# Patient Record
Sex: MALE | Race: WHITE | HISPANIC OR LATINO | Employment: OTHER | URBAN - METROPOLITAN AREA
[De-identification: names, ages, dates, MRNs, and addresses within clinical notes are randomized per-mention and may not be internally consistent; named-entity substitution may affect disease eponyms.]

---

## 2022-06-17 ENCOUNTER — APPOINTMENT (OUTPATIENT)
Dept: RADIOLOGY | Facility: MEDICAL CENTER | Age: 87
DRG: 084 | End: 2022-06-17
Attending: SURGERY
Payer: COMMERCIAL

## 2022-06-17 ENCOUNTER — APPOINTMENT (OUTPATIENT)
Dept: RADIOLOGY | Facility: MEDICAL CENTER | Age: 87
DRG: 084 | End: 2022-06-17
Attending: EMERGENCY MEDICINE
Payer: COMMERCIAL

## 2022-06-17 ENCOUNTER — HOSPITAL ENCOUNTER (INPATIENT)
Facility: MEDICAL CENTER | Age: 87
LOS: 2 days | DRG: 084 | End: 2022-06-19
Attending: EMERGENCY MEDICINE | Admitting: SURGERY
Payer: COMMERCIAL

## 2022-06-17 DIAGNOSIS — R55 SYNCOPE, UNSPECIFIED SYNCOPE TYPE: ICD-10-CM
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
## 2022-06-17 DIAGNOSIS — S06.5XAA SUBDURAL HEMATOMA (HCC): ICD-10-CM

## 2022-06-17 DIAGNOSIS — I10 PRIMARY HYPERTENSION: ICD-10-CM

## 2022-06-17 PROBLEM — N28.9 RENAL INSUFFICIENCY: Status: ACTIVE | Noted: 2022-06-17

## 2022-06-17 PROBLEM — R33.9 URINARY RETENTION: Status: ACTIVE | Noted: 2022-06-17

## 2022-06-17 PROBLEM — D64.9 ANEMIA: Status: ACTIVE | Noted: 2022-06-17

## 2022-06-17 PROBLEM — Z53.09 CONTRAINDICATION TO DEEP VEIN THROMBOSIS (DVT) PROPHYLAXIS: Status: ACTIVE | Noted: 2022-06-17

## 2022-06-17 PROBLEM — T14.90XA TRAUMA: Status: ACTIVE | Noted: 2022-06-17

## 2022-06-17 PROBLEM — E03.9 HYPOTHYROID: Status: ACTIVE | Noted: 2022-06-17

## 2022-06-17 LAB
ABO + RH BLD: NORMAL
ABO GROUP BLD: NORMAL
ALBUMIN SERPL BCP-MCNC: 3.6 G/DL (ref 3.2–4.9)
ALBUMIN/GLOB SERPL: 1.2 G/DL
ALP SERPL-CCNC: 72 U/L (ref 30–99)
ALT SERPL-CCNC: 10 U/L (ref 2–50)
ANION GAP SERPL CALC-SCNC: 11 MMOL/L (ref 7–16)
APTT PPP: 24.5 SEC (ref 24.7–36)
AST SERPL-CCNC: 20 U/L (ref 12–45)
BASOPHILS # BLD AUTO: 1.5 % (ref 0–1.8)
BASOPHILS # BLD: 0.08 K/UL (ref 0–0.12)
BILIRUB SERPL-MCNC: 0.3 MG/DL (ref 0.1–1.5)
BLD GP AB SCN SERPL QL: NORMAL
BUN SERPL-MCNC: 31 MG/DL (ref 8–22)
CALCIUM SERPL-MCNC: 8.6 MG/DL (ref 8.5–10.5)
CFT BLD TEG: 3 MIN (ref 4.6–9.1)
CFT P HPASE BLD TEG: 3.2 MIN (ref 4.3–8.3)
CHLORIDE SERPL-SCNC: 108 MMOL/L (ref 96–112)
CLOT ANGLE BLD TEG: 78.6 DEGREES (ref 63–78)
CLOT LYSIS 30M P MA LENFR BLD TEG: 1.2 % (ref 0–2.6)
CO2 SERPL-SCNC: 23 MMOL/L (ref 20–33)
CREAT SERPL-MCNC: 1.71 MG/DL (ref 0.5–1.4)
CT.EXTRINSIC BLD ROTEM: 0.8 MIN (ref 0.8–2.1)
EKG IMPRESSION: NORMAL
EOSINOPHIL # BLD AUTO: 0.1 K/UL (ref 0–0.51)
EOSINOPHIL NFR BLD: 1.9 % (ref 0–6.9)
ERYTHROCYTE [DISTWIDTH] IN BLOOD BY AUTOMATED COUNT: 44.5 FL (ref 35.9–50)
EST. AVERAGE GLUCOSE BLD GHB EST-MCNC: 111 MG/DL
GFR SERPLBLD CREATININE-BSD FMLA CKD-EPI: 37 ML/MIN/1.73 M 2
GLOBULIN SER CALC-MCNC: 2.9 G/DL (ref 1.9–3.5)
GLUCOSE BLD STRIP.AUTO-MCNC: 121 MG/DL (ref 65–99)
GLUCOSE SERPL-MCNC: 156 MG/DL (ref 65–99)
HBA1C MFR BLD: 5.5 % (ref 4–5.6)
HCT VFR BLD AUTO: 30.8 % (ref 42–52)
HGB BLD-MCNC: 10.6 G/DL (ref 14–18)
HGB RETIC QN AUTO: 34.9 PG/CELL (ref 29–35)
IMM GRANULOCYTES # BLD AUTO: 0.02 K/UL (ref 0–0.11)
IMM GRANULOCYTES NFR BLD AUTO: 0.4 % (ref 0–0.9)
IMM RETICS NFR: 4.3 % (ref 9.3–17.4)
INR PPP: 1.11 (ref 0.87–1.13)
IRON SATN MFR SERPL: 28 % (ref 15–55)
IRON SERPL-MCNC: 59 UG/DL (ref 50–180)
LYMPHOCYTES # BLD AUTO: 1.02 K/UL (ref 1–4.8)
LYMPHOCYTES NFR BLD: 19.3 % (ref 22–41)
MAGNESIUM SERPL-MCNC: 2.2 MG/DL (ref 1.5–2.5)
MCF BLD TEG: 66.4 MM (ref 52–69)
MCF.PLATELET INHIB BLD ROTEM: 34.3 MM (ref 15–32)
MCH RBC QN AUTO: 31.6 PG (ref 27–33)
MCHC RBC AUTO-ENTMCNC: 34.4 G/DL (ref 33.7–35.3)
MCV RBC AUTO: 91.9 FL (ref 81.4–97.8)
MONOCYTES # BLD AUTO: 0.45 K/UL (ref 0–0.85)
MONOCYTES NFR BLD AUTO: 8.5 % (ref 0–13.4)
NEUTROPHILS # BLD AUTO: 3.61 K/UL (ref 1.82–7.42)
NEUTROPHILS NFR BLD: 68.4 % (ref 44–72)
NRBC # BLD AUTO: 0 K/UL
NRBC BLD-RTO: 0 /100 WBC
PA AA BLD-ACNC: 0 % (ref 0–11)
PA ADP BLD-ACNC: 0 % (ref 0–17)
PHOSPHATE SERPL-MCNC: 2.9 MG/DL (ref 2.5–4.5)
PLATELET # BLD AUTO: 207 K/UL (ref 164–446)
PMV BLD AUTO: 9 FL (ref 9–12.9)
POTASSIUM SERPL-SCNC: 4.4 MMOL/L (ref 3.6–5.5)
PROT SERPL-MCNC: 6.5 G/DL (ref 6–8.2)
PROTHROMBIN TIME: 13.9 SEC (ref 12–14.6)
RBC # BLD AUTO: 3.35 M/UL (ref 4.7–6.1)
RETICS # AUTO: 0.03 M/UL (ref 0.04–0.06)
RETICS/RBC NFR: 0.9 % (ref 0.8–2.1)
RH BLD: NORMAL
SARS-COV+SARS-COV-2 AG RESP QL IA.RAPID: NOTDETECTED
SODIUM SERPL-SCNC: 142 MMOL/L (ref 135–145)
SPECIMEN SOURCE: NORMAL
TEG ALGORITHM TGALG: ABNORMAL
TIBC SERPL-MCNC: 210 UG/DL (ref 250–450)
TROPONIN T SERPL-MCNC: 49 NG/L (ref 6–19)
UIBC SERPL-MCNC: 151 UG/DL (ref 110–370)
WBC # BLD AUTO: 5.3 K/UL (ref 4.8–10.8)

## 2022-06-17 PROCEDURE — 85610 PROTHROMBIN TIME: CPT

## 2022-06-17 PROCEDURE — 84100 ASSAY OF PHOSPHORUS: CPT

## 2022-06-17 PROCEDURE — 770022 HCHG ROOM/CARE - ICU (200)

## 2022-06-17 PROCEDURE — 93005 ELECTROCARDIOGRAM TRACING: CPT | Performed by: SURGERY

## 2022-06-17 PROCEDURE — 36415 COLL VENOUS BLD VENIPUNCTURE: CPT

## 2022-06-17 PROCEDURE — 85384 FIBRINOGEN ACTIVITY: CPT

## 2022-06-17 PROCEDURE — G0390 TRAUMA RESPONS W/HOSP CRITI: HCPCS

## 2022-06-17 PROCEDURE — 86901 BLOOD TYPING SEROLOGIC RH(D): CPT

## 2022-06-17 PROCEDURE — 87426 SARSCOV CORONAVIRUS AG IA: CPT

## 2022-06-17 PROCEDURE — 51798 US URINE CAPACITY MEASURE: CPT

## 2022-06-17 PROCEDURE — 85576 BLOOD PLATELET AGGREGATION: CPT

## 2022-06-17 PROCEDURE — 70450 CT HEAD/BRAIN W/O DYE: CPT

## 2022-06-17 PROCEDURE — 86850 RBC ANTIBODY SCREEN: CPT

## 2022-06-17 PROCEDURE — 700102 HCHG RX REV CODE 250 W/ 637 OVERRIDE(OP): Performed by: SURGERY

## 2022-06-17 PROCEDURE — 85347 COAGULATION TIME ACTIVATED: CPT

## 2022-06-17 PROCEDURE — 82962 GLUCOSE BLOOD TEST: CPT | Mod: 91

## 2022-06-17 PROCEDURE — 83540 ASSAY OF IRON: CPT

## 2022-06-17 PROCEDURE — 70450 CT HEAD/BRAIN W/O DYE: CPT | Mod: ME

## 2022-06-17 PROCEDURE — A9270 NON-COVERED ITEM OR SERVICE: HCPCS | Performed by: SURGERY

## 2022-06-17 PROCEDURE — 80053 COMPREHEN METABOLIC PANEL: CPT

## 2022-06-17 PROCEDURE — 85025 COMPLETE CBC W/AUTO DIFF WBC: CPT

## 2022-06-17 PROCEDURE — 83735 ASSAY OF MAGNESIUM: CPT

## 2022-06-17 PROCEDURE — 83550 IRON BINDING TEST: CPT

## 2022-06-17 PROCEDURE — 84484 ASSAY OF TROPONIN QUANT: CPT

## 2022-06-17 PROCEDURE — 83036 HEMOGLOBIN GLYCOSYLATED A1C: CPT

## 2022-06-17 PROCEDURE — 85046 RETICYTE/HGB CONCENTRATE: CPT

## 2022-06-17 PROCEDURE — 86900 BLOOD TYPING SEROLOGIC ABO: CPT

## 2022-06-17 PROCEDURE — 700105 HCHG RX REV CODE 258: Performed by: SURGERY

## 2022-06-17 PROCEDURE — 99291 CRITICAL CARE FIRST HOUR: CPT | Performed by: SURGERY

## 2022-06-17 PROCEDURE — 85730 THROMBOPLASTIN TIME PARTIAL: CPT

## 2022-06-17 PROCEDURE — 99291 CRITICAL CARE FIRST HOUR: CPT

## 2022-06-17 RX ORDER — ACETAMINOPHEN 325 MG/1
650 TABLET ORAL EVERY 4 HOURS PRN
Status: DISCONTINUED | OUTPATIENT
Start: 2022-06-17 | End: 2022-06-19 | Stop reason: HOSPADM

## 2022-06-17 RX ORDER — TAMSULOSIN HYDROCHLORIDE 0.4 MG/1
0.4 CAPSULE ORAL
Status: DISCONTINUED | OUTPATIENT
Start: 2022-06-18 | End: 2022-06-19 | Stop reason: HOSPADM

## 2022-06-17 RX ORDER — TAMSULOSIN HYDROCHLORIDE 0.4 MG/1
0.4 CAPSULE ORAL EVERY MORNING
COMMUNITY

## 2022-06-17 RX ORDER — ONDANSETRON 2 MG/ML
4 INJECTION INTRAMUSCULAR; INTRAVENOUS EVERY 4 HOURS PRN
Status: CANCELLED | OUTPATIENT
Start: 2022-06-17

## 2022-06-17 RX ORDER — LEVETIRACETAM 500 MG/1
500 TABLET ORAL EVERY 12 HOURS
Status: CANCELLED | OUTPATIENT
Start: 2022-06-17 | End: 2022-06-24

## 2022-06-17 RX ORDER — POLYETHYLENE GLYCOL 3350 17 G/17G
1 POWDER, FOR SOLUTION ORAL 2 TIMES DAILY
Status: CANCELLED | OUTPATIENT
Start: 2022-06-17

## 2022-06-17 RX ORDER — OXYCODONE HYDROCHLORIDE 5 MG/1
5 TABLET ORAL
Status: DISCONTINUED | OUTPATIENT
Start: 2022-06-17 | End: 2022-06-17

## 2022-06-17 RX ORDER — BISACODYL 10 MG
10 SUPPOSITORY, RECTAL RECTAL
Status: CANCELLED | OUTPATIENT
Start: 2022-06-17

## 2022-06-17 RX ORDER — ACETAMINOPHEN 325 MG/1
650 TABLET ORAL EVERY 4 HOURS PRN
Status: DISCONTINUED | OUTPATIENT
Start: 2022-06-17 | End: 2022-06-18

## 2022-06-17 RX ORDER — AMOXICILLIN 250 MG
1 CAPSULE ORAL
Status: CANCELLED | OUTPATIENT
Start: 2022-06-17

## 2022-06-17 RX ORDER — BUTALBITAL, ACETAMINOPHEN AND CAFFEINE 50; 325; 40 MG/1; MG/1; MG/1
1 TABLET ORAL EVERY 6 HOURS PRN
Status: DISCONTINUED | OUTPATIENT
Start: 2022-06-17 | End: 2022-06-19 | Stop reason: HOSPADM

## 2022-06-17 RX ORDER — HYDROMORPHONE HYDROCHLORIDE 1 MG/ML
0.5 INJECTION, SOLUTION INTRAMUSCULAR; INTRAVENOUS; SUBCUTANEOUS
Status: CANCELLED | OUTPATIENT
Start: 2022-06-17

## 2022-06-17 RX ORDER — OXYCODONE HYDROCHLORIDE 5 MG/1
5 TABLET ORAL
Status: CANCELLED | OUTPATIENT
Start: 2022-06-17

## 2022-06-17 RX ORDER — AMOXICILLIN 250 MG
1 CAPSULE ORAL NIGHTLY
Status: DISCONTINUED | OUTPATIENT
Start: 2022-06-17 | End: 2022-06-19 | Stop reason: HOSPADM

## 2022-06-17 RX ORDER — FAMOTIDINE 20 MG/1
20 TABLET, FILM COATED ORAL 2 TIMES DAILY
Status: CANCELLED | OUTPATIENT
Start: 2022-06-17

## 2022-06-17 RX ORDER — FAMOTIDINE 20 MG/1
20 TABLET, FILM COATED ORAL DAILY
Status: DISCONTINUED | OUTPATIENT
Start: 2022-06-17 | End: 2022-06-17

## 2022-06-17 RX ORDER — ONDANSETRON 2 MG/ML
4 INJECTION INTRAMUSCULAR; INTRAVENOUS EVERY 4 HOURS PRN
Status: DISCONTINUED | OUTPATIENT
Start: 2022-06-17 | End: 2022-06-19 | Stop reason: HOSPADM

## 2022-06-17 RX ORDER — LEVOTHYROXINE SODIUM 0.05 MG/1
50 TABLET ORAL
Status: DISCONTINUED | OUTPATIENT
Start: 2022-06-18 | End: 2022-06-19 | Stop reason: HOSPADM

## 2022-06-17 RX ORDER — OXYCODONE HYDROCHLORIDE 10 MG/1
10 TABLET ORAL
Status: CANCELLED | OUTPATIENT
Start: 2022-06-17

## 2022-06-17 RX ORDER — AMOXICILLIN 250 MG
1 CAPSULE ORAL NIGHTLY
Status: CANCELLED | OUTPATIENT
Start: 2022-06-17

## 2022-06-17 RX ORDER — LISINOPRIL 2.5 MG/1
2.5 TABLET ORAL DAILY
Status: DISCONTINUED | OUTPATIENT
Start: 2022-06-18 | End: 2022-06-18

## 2022-06-17 RX ORDER — OXYCODONE HYDROCHLORIDE 5 MG/1
2.5 TABLET ORAL
Status: DISCONTINUED | OUTPATIENT
Start: 2022-06-17 | End: 2022-06-19 | Stop reason: HOSPADM

## 2022-06-17 RX ORDER — ENEMA 19; 7 G/133ML; G/133ML
1 ENEMA RECTAL
Status: CANCELLED | OUTPATIENT
Start: 2022-06-17

## 2022-06-17 RX ORDER — MORPHINE SULFATE 4 MG/ML
1 INJECTION INTRAVENOUS
Status: DISCONTINUED | OUTPATIENT
Start: 2022-06-17 | End: 2022-06-18

## 2022-06-17 RX ORDER — BUTALBITAL, ACETAMINOPHEN AND CAFFEINE 50; 325; 40 MG/1; MG/1; MG/1
1 TABLET ORAL EVERY 6 HOURS PRN
Status: CANCELLED | OUTPATIENT
Start: 2022-06-17

## 2022-06-17 RX ORDER — ENEMA 19; 7 G/133ML; G/133ML
1 ENEMA RECTAL
Status: DISCONTINUED | OUTPATIENT
Start: 2022-06-17 | End: 2022-06-19 | Stop reason: HOSPADM

## 2022-06-17 RX ORDER — SODIUM CHLORIDE 9 MG/ML
INJECTION, SOLUTION INTRAVENOUS CONTINUOUS
Status: CANCELLED | OUTPATIENT
Start: 2022-06-17

## 2022-06-17 RX ORDER — ONDANSETRON 4 MG/1
4 TABLET, ORALLY DISINTEGRATING ORAL EVERY 4 HOURS PRN
Status: DISCONTINUED | OUTPATIENT
Start: 2022-06-17 | End: 2022-06-19 | Stop reason: HOSPADM

## 2022-06-17 RX ORDER — LISINOPRIL 2.5 MG/1
2.5 TABLET ORAL EVERY MORNING
COMMUNITY

## 2022-06-17 RX ORDER — AMLODIPINE BESYLATE 10 MG/1
10 TABLET ORAL EVERY MORNING
Status: ON HOLD | COMMUNITY
End: 2022-06-19 | Stop reason: SDUPTHER

## 2022-06-17 RX ORDER — LEVETIRACETAM 500 MG/5ML
500 INJECTION, SOLUTION, CONCENTRATE INTRAVENOUS EVERY 12 HOURS
Status: CANCELLED | OUTPATIENT
Start: 2022-06-17 | End: 2022-06-24

## 2022-06-17 RX ORDER — BISACODYL 10 MG
10 SUPPOSITORY, RECTAL RECTAL
Status: DISCONTINUED | OUTPATIENT
Start: 2022-06-17 | End: 2022-06-19 | Stop reason: HOSPADM

## 2022-06-17 RX ORDER — ONDANSETRON 4 MG/1
4 TABLET, ORALLY DISINTEGRATING ORAL EVERY 4 HOURS PRN
Status: CANCELLED | OUTPATIENT
Start: 2022-06-17

## 2022-06-17 RX ORDER — DOCUSATE SODIUM 100 MG/1
100 CAPSULE, LIQUID FILLED ORAL 2 TIMES DAILY
Status: CANCELLED | OUTPATIENT
Start: 2022-06-17

## 2022-06-17 RX ORDER — MORPHINE SULFATE 4 MG/ML
2 INJECTION INTRAVENOUS
Status: DISCONTINUED | OUTPATIENT
Start: 2022-06-17 | End: 2022-06-17

## 2022-06-17 RX ORDER — ACETAMINOPHEN 650 MG/1
650 SUPPOSITORY RECTAL EVERY 4 HOURS PRN
Status: DISCONTINUED | OUTPATIENT
Start: 2022-06-17 | End: 2022-06-19 | Stop reason: HOSPADM

## 2022-06-17 RX ORDER — OXYCODONE HYDROCHLORIDE 5 MG/1
2.5 TABLET ORAL
Status: DISCONTINUED | OUTPATIENT
Start: 2022-06-17 | End: 2022-06-17

## 2022-06-17 RX ORDER — AMOXICILLIN 250 MG
1 CAPSULE ORAL
Status: DISCONTINUED | OUTPATIENT
Start: 2022-06-17 | End: 2022-06-19 | Stop reason: HOSPADM

## 2022-06-17 RX ORDER — DOCUSATE SODIUM 100 MG/1
100 CAPSULE, LIQUID FILLED ORAL 2 TIMES DAILY
Status: DISCONTINUED | OUTPATIENT
Start: 2022-06-17 | End: 2022-06-19 | Stop reason: HOSPADM

## 2022-06-17 RX ORDER — FAMOTIDINE 20 MG/1
20 TABLET, FILM COATED ORAL DAILY
Status: DISCONTINUED | OUTPATIENT
Start: 2022-06-17 | End: 2022-06-18

## 2022-06-17 RX ORDER — SODIUM CHLORIDE 9 MG/ML
INJECTION, SOLUTION INTRAVENOUS CONTINUOUS
Status: DISCONTINUED | OUTPATIENT
Start: 2022-06-17 | End: 2022-06-18

## 2022-06-17 RX ORDER — LEVOTHYROXINE SODIUM 0.05 MG/1
50 TABLET ORAL EVERY MORNING
COMMUNITY

## 2022-06-17 RX ORDER — MULTIVIT-MIN/FOLIC/VIT K/LYCOP 400-300MCG
1 TABLET ORAL EVERY MORNING
COMMUNITY

## 2022-06-17 RX ORDER — POLYETHYLENE GLYCOL 3350 17 G/17G
1 POWDER, FOR SOLUTION ORAL 2 TIMES DAILY
Status: DISCONTINUED | OUTPATIENT
Start: 2022-06-17 | End: 2022-06-19 | Stop reason: HOSPADM

## 2022-06-17 RX ADMIN — FAMOTIDINE 20 MG: 20 TABLET ORAL at 17:17

## 2022-06-17 RX ADMIN — DOCUSATE SODIUM 100 MG: 100 CAPSULE, LIQUID FILLED ORAL at 17:17

## 2022-06-17 RX ADMIN — SODIUM CHLORIDE: 9 INJECTION, SOLUTION INTRAVENOUS at 14:06

## 2022-06-17 RX ADMIN — SODIUM CHLORIDE: 9 INJECTION, SOLUTION INTRAVENOUS at 23:45

## 2022-06-17 ASSESSMENT — FIBROSIS 4 INDEX: FIB4 SCORE: 2.78

## 2022-06-17 NOTE — ASSESSMENT & PLAN NOTE
Left frontoparietal convexity subdural hematoma with a maximum thickness of 9.2 mm. Mild mass effect on the frontal horn of the left lateral ventricle. mBIG 3  Non-operative management.   Repeat CT head stable   Post traumatic pharmacologic seizure prophylaxis not needed per neurosurgery.  Trace Mirza MD. Neurosurgeon. [facility].

## 2022-06-17 NOTE — PROGRESS NOTES
Pharmacy [facility] notified of patient arrival. Patient indicates he took his home medications this morning. RN to notify pharmacy to retime for appropriate home medication time.

## 2022-06-17 NOTE — ED NOTES
BIB EMS as a TBI after experiencing a syncopal episode and sticking his head on the cement. +LOC. Bump noted to posterior right head. + head CT. Pt upgraded to a trauma green.

## 2022-06-17 NOTE — CARE PLAN
The patient is Watcher - Medium risk of patient condition declining or worsening       Progress made toward(s) clinical / shift goals:  Q 1 hour neuro assessments, comfort, pain management, skin breakdown prevention, encourage mobility, encourage regular bowel movements and urination      Problem: Neuro Status  Goal: Neuro status will remain stable or improve  Outcome: Progressing     Problem: Bowel Elimination  Goal: Establish and maintain regular bowel function  Outcome: Progressing     Problem: Urinary Elimination  Goal: Establish and maintain regular urinary output  Outcome: Progressing     Problem: Risk for Aspiration  Goal: Patient's risk for aspiration will be absent or decrease  Outcome: Progressing     Problem: Self Care  Goal: Patient will have the ability to perform ADLs independently or with assistance (bathe, groom, dress, toilet and feed)  Outcome: Progressing     Problem: Fall Risk  Goal: Patient will remain free from falls  Outcome: Progressing     Problem: Knowledge Deficit - Standard  Goal: Patient and family/care givers will demonstrate understanding of plan of care, disease process/condition, diagnostic tests and medications  Outcome: Progressing     Problem: Pain - Standard  Goal: Alleviation of pain or a reduction in pain to the patient’s comfort goal  Outcome: Progressing

## 2022-06-17 NOTE — PROGRESS NOTES
1305: Pt arrived to ICU    Vitals:   · HR: 64  · BP: 130/63  · RR: 18  · SaO2: 98 on Room Air  · Wt: 58.7kg  · Temp 98 F   _____________________________________________________________    2 RN skin check completed with Tammy and Paty      ______________________________________________________________    Personal belongings:   · watch  · necklace  · boots  · socks  · underwear  · 2 shirts  ____________________________________________________________    4 Eyes Skin Assessment Completed by OLGA Munoz and OLGA Newman.    Head small bump  Ears WDL  Nose WDL  Mouth Left lip corner purpling   Neck WDL, reddness to back of neck blanching   Breast/Chest WDL  Shoulder Blades WDL  Spine WDL  (R) Arm/Elbow/Hand Abrasion and Scar  (L) Arm/Elbow/Hand Abrasion, redness and blanching   Abdomen WDL  Groin WDL  Scrotum/Coccyx/Buttocks slow to avery redness mepilex placed.   (R) Leg Abrasion  (L) Leg Abrasion  (R) Heel/Foot/Toe Redness and abrasion under the nail bed  (L) Heel/Foot/Toe WDL          Devices In Places ECG, Blood Pressure Cuff, Pulse Ox and SCD's      Interventions In Place Sacral Mepilex, Pillows, Q2 Turns, Heels Loaded W/Pillows and Pressure Redistribution Mattress    Possible Skin Injury No    Pictures Uploaded Into Epic N/A  Wound Consult Placed N/A  RN Wound Prevention Protocol Ordered No \

## 2022-06-17 NOTE — ASSESSMENT & PLAN NOTE
Prophylactic anticoagulation for thrombotic prevention initially contraindicated secondary to elevated bleeding risk.  6/18 Trauma surveillance venous duplex scanning ordered.  6/18 Prophylactic dose enoxaparin initiated.

## 2022-06-17 NOTE — ASSESSMENT & PLAN NOTE
TBI after experiencing a syncopal episode and sticking his head on the cement.  TBI Alert - upgraded to trauma green post scan.  Brandt Gutierrez MD. Trauma Surgery.

## 2022-06-17 NOTE — H&P
Trauma Surgery History and Physical  6/17/2022    Trauma Physician: Brandt Gutierrez MD.     CC: Trauma The patient was triaged as a TBI alert upgraded to a Trauma Green in accordance with established pre hospital protols. An expeditious primary and secondary survey with required adjuncts was conducted. See Trauma Narrator for full details.    HPI: This is a 89 y.o male presents to [facility] for head injury.   The patient was sitting outside in the sun after watering the grass. He stood up and got light headed - falling backwards striking his head. His son-in-law was nearby an heard the fall.  There was a brief loss of consciousness. He denies any preceding chest pain or shortness of breath.  Family members report that he has been having episodes of lightheadedness for several weeks.  His only complaint is of a mild headache.  He has no neck or back pain, no abdominal pain.  He has no weakness, numbness or tingling.       EMS reports he was somewhat hypotensive with a systolic blood pressure around 90 when they arrived, with IV fluid administration the patient arrives here with a blood pressure of 120 systolic.     Past medical history significant for hypertension, thyroid disease and enlarged prostate.       Past Medical History:   Diagnosis Date   • Hypertension    • Hypothyroid        History reviewed. No pertinent surgical history.    Current Facility-Administered Medications   Medication Dose Route Frequency Provider Last Rate Last Admin   • [START ON 6/18/2022] lisinopril (PRINIVIL) tablet 2.5 mg  2.5 mg Oral DAILY Brandt Gutierrez M.D.       • [START ON 6/18/2022] tamsulosin (FLOMAX) capsule 0.4 mg  0.4 mg Oral AFTER BREAKFAST Brandt Gutierrez M.D.       • [START ON 6/18/2022] levothyroxine (SYNTHROID) tablet 50 mcg  50 mcg Oral AM ES Brandt Gutierrez M.D.       • Respiratory Therapy Consult   Nebulization Continuous RT Brandt Gutierrez M.D.       • Pharmacy  Consult Request ...Pain Management Review 1 Each  1 Each Other PHARMACY TO DOSE Brandt Gutierrez M.D.       • ondansetron (ZOFRAN) syringe/vial injection 4 mg  4 mg Intravenous Q4HRS PRN Brandt Gutierrez M.D.       • ondansetron (ZOFRAN ODT) dispertab 4 mg  4 mg Oral Q4HRS PRN Brandt Gutierrez M.D.       • docusate sodium (COLACE) capsule 100 mg  100 mg Oral BID Brandt Gutierrez M.D.       • senna-docusate (PERICOLACE or SENOKOT S) 8.6-50 MG per tablet 1 Tablet  1 Tablet Oral Nightly Brandt Gutierrez M.D.       • senna-docusate (PERICOLACE or SENOKOT S) 8.6-50 MG per tablet 1 Tablet  1 Tablet Oral Q24HRS PRN Brandt Gutierrez M.D.       • polyethylene glycol/lytes (MIRALAX) PACKET 1 Packet  1 Packet Oral BID Brandt Gutierrez M.D.       • [START ON 6/18/2022] magnesium hydroxide (MILK OF MAGNESIA) suspension 30 mL  30 mL Oral DAILY Brandt Gutierrez M.D.       • bisacodyl (DULCOLAX) suppository 10 mg  10 mg Rectal Q24HRS PRN Brandt Gutierrez M.D.       • sodium phosphate (Fleet) enema 133 mL  1 Each Rectal Once PRN Brandt Gutierrez M.D.       • NS infusion   Intravenous Continuous Brandt Gutierrez M.D. 83 mL/hr at 06/17/22 1406 New Bag at 06/17/22 1406   • acetaminophen (Tylenol) tablet 650 mg  650 mg Oral Q4HRS PRN Brandt Gutierrez M.D.        Or   • acetaminophen (TYLENOL) suppository 650 mg  650 mg Rectal Q4HRS PRN Brandt Gutierrez M.D.       • morphine 4 MG/ML injection 1 mg  1 mg Intravenous Q3HRS PRN Markell Mcknight, A.P.N.        Or   • oxyCODONE immediate-release (ROXICODONE) tablet 2.5 mg  2.5 mg Oral Q3HRS PRN Markell Mcknight, A.P.N.       • acetaminophen (Tylenol) tablet 650 mg  650 mg Oral Q4HRS PRN Markell Mcknight, A.P.N.       • butalbital/apap/caffeine (Fioricet) -40 mg per tablet 1 Tablet  1 Tablet Oral Q6HRS PRN Brandt Gutierrez M.D.       • famotidine (PEPCID) tablet 20 mg  20 mg Enteral Tube DAILY Brandt Gutierrez M.D.        Or   •  famotidine (PEPCID) injection 20 mg  20 mg Intravenous DAILY Brandt Gutierrez M.D.           Social History     Socioeconomic History   • Marital status: Not on file     Spouse name: Not on file   • Number of children: Not on file   • Years of education: Not on file   • Highest education level: Not on file   Occupational History   • Not on file   Tobacco Use   • Smoking status: Never Smoker   • Smokeless tobacco: Never Used   Vaping Use   • Vaping Use: Never used   Substance and Sexual Activity   • Alcohol use: Never   • Drug use: Never   • Sexual activity: Not on file   Other Topics Concern   • Not on file   Social History Narrative   • Not on file     Social Determinants of Health     Financial Resource Strain: Not on file   Food Insecurity: Not on file   Transportation Needs: Not on file   Physical Activity: Not on file   Stress: Not on file   Social Connections: Not on file   Intimate Partner Violence: Not on file   Housing Stability: Not on file       History reviewed. No pertinent family history.    Allergies:  Patient has no known allergies.    Review of Systems:  Constitutional: Negative for fever, chills, weight loss, malaise/fatigue and diaphoresis.   HENT: Negative for hearing loss, ear pain, nosebleeds, congestion, sore throat, neck pain, and ear discharge.    Eyes: Negative for blurred vision, double vision, and redness.   Respiratory: Negative for cough, sputum production, shortness of breath, wheezing and stridor.   Cardiovascular: Negative for chest pain, palpitations.   Gastrointestinal: Negative for heartburn, nausea, vomiting, abdominal pain, diarrhea, constipation.  Genitourinary: Negative for dysuria, urgency, frequency.   Musculoskeletal: Negative for myalgias, back pain, joint pain and falls.   Skin: Negative for itching and rash.  Neurological: Negative for dizziness, loss of consciousness, weakness. Positive for headache.   Endo/Heme/Allergies: Negative for environmental allergies. Does  "not bruise/bleed easily.   Psychiatric/Behavioral: Negative for depression and substance abuse. The patient is not nervous/anxious.    Physical Exam:  /63   Pulse 64   Temp 36.7 °C (98 °F) (Temporal)   Resp 18   Ht 1.676 m (5' 6\")   Wt 58.7 kg (129 lb 6.6 oz)   SpO2 98%     Constitutional: Awake, alert, oriented x3. No acute distress. GCS 15. E4 V5 M6.  Head: No cephalohematoma. Pupils are 3 mm,  reactive bilaterally. Midface stable. No malocclusion.  TMs clear bilaterally. No drainage from the mouth or nose.  There is a small right occipital hematoma.  No wound or laceration present.  Neck: No tracheal deviation. No midline cervical spine tenderness. No C-collar in place.   Cardiovascular: Normal rate, regular rhythm, normal heart sounds and intact distal pulses.  Exam reveals no gallop and no friction rub.  No murmur heard.  Pulmonary/Chest: Clavicles nontender to palpation. There is no chest wall tenderness bilaterally.  No crepitus. Positive breath sounds bilaterally.   Abdominal: Soft, nondistended. Nontender to palpation. There is no anterior diastasis of the pelvic symphysis. The pelvis is stable to anterior-posterior compression.    Musculoskeletal: Right upper extremity grossly atraumatic, palpable radial pulse. 5/5  strength. Full ROM and strength at elbow.  Left upper extremity grossly atraumatic, palpable radial pulse. 5/5  strength. Full ROM and strength at elbow.  Right lower extremity grossly atraumatic. 5/5 strength in ankle plantar flexion and dorsiflexion. No pain and full ROM at right knee and hip.   Left  lower extremity grossly atraumatic. 5/5 strength in ankle plantar flexion and dorsiflexion. No pain and full ROM at left knee and hip.   Back: Midline thoracic and lumbar spines are nontender to palpation. No step-offs.   : Normal male external genitalia. Rectal exam not done. No blood visible at urethral meatus.   Neurological: Sensation intact to light touch dorsum and " plantar surfaces of both feet and the medial and lateral aspects of both lower legs.  Sensation intact to light touch dorsum and plantar surfaces of both hands.   Skin: Skin is warm and dry.  No diaphoresis. No erythema. No pallor.     Labs:  Recent Labs     06/17/22  1145   WBC 5.3   RBC 3.35*   HEMOGLOBIN 10.6*   HEMATOCRIT 30.8*   MCV 91.9   MCH 31.6   MCHC 34.4   RDW 44.5   PLATELETCT 207   MPV 9.0     Recent Labs     06/17/22  1145   SODIUM 142   POTASSIUM 4.4   CHLORIDE 108   CO2 23   GLUCOSE 156*   BUN 31*   CREATININE 1.71*   CALCIUM 8.6     Recent Labs     06/17/22  1153   APTT 24.5*   INR 1.11     Recent Labs     06/17/22  1145 06/17/22  1153   ASTSGOT 20  --    ALTSGPT 10  --    TBILIRUBIN 0.3  --    ALKPHOSPHAT 72  --    GLOBULIN 2.9  --    INR  --  1.11       Radiology:  CT-HEAD W/O   Final Result      1. Left frontoparietal convexity subdural hematoma with a maximum thickness of 9.2 mm. Mild mass effect on the frontal horn of the left lateral ventricle. Basilar cisterns are preserved.   2. Age-related central and cortical atrophy.   3. Diffuse chronic microangiopathic white matter changes.      Based solely on CT findings, the brain injury guideline category is mBIG 3.      EDH   IVH   Displaced skull fx   SDH > 8mm   IPH > 8mm or multiple   SAH bi-hemispheric or > 3mm      The original BIG retrospective analysis found radiographic progression in 0% of BIG 1 patients and 2.6% BIG 2.      I, Dr. Deshawn Pemberton, discussed the results of this examination directly by phone with Dr. Ravi on 6/17/2022 at 1150 hours.      EC-ECHOCARDIOGRAM COMPLETE W/O CONT    (Results Pending)   CT-HEAD W/O    (Results Pending)   US-CAROTID DOPPLER BILAT    (Results Pending)         Assessment: This is a 89 y.o male with syncopal event and right subdural hematoma - mBIG 3.    Plan:   Admit to ICU  NS consult - Dr Hermes Arreguin per Dr Mirza  Repeat CT head in 6 hr  Covid testing  Syncope evaluation with EKG /  cardiac monitor / Echocardiogram / carotid duplex studies  Restart lisinopril for HTN / hold Norvasc - follow BP    Trauma  TBI after experiencing a syncopal episode and sticking his head on the cement.  TBI Alert. Upgraded to trauma green post scan.  Brandt Gutierrez MD. Trauma Surgery.    Syncope  Reported syncopal event.   EKG. ECHO cardiogram. Carotid Doppler.    Contraindication to deep vein thrombosis (DVT) prophylaxis  Prophylactic anticoagulation for thrombotic prevention initially contraindicated secondary to elevated bleeding risk.  6/18 Trauma surveillance venous duplex scanning ordered.    Traumatic subdural hematoma, initial encounter (HCC)  Left frontoparietal convexity subdural hematoma with a maximum thickness of 9.2 mm. Mild mass effect on the frontal horn of the left lateral ventricle. mBIG 3  Non-operative management.  Follow CT head in 6 hours..   Post traumatic pharmacologic seizure prophylaxis not needed per neurosurgery.  Trace Mirza MD. Neurosurgeon. [facility].    Hypertension  Chronic condition treated with Norvasc and Prinvil.  6/17 Resumed Prinvil.    Hypothyroid  Chronic condition treated with Synthroid  Resumed maintenance medication on admission.    Urinary retention  Chronic condition treated with Flomax.  Resumed maintenance medication on admission.    Anemia  6/17 Hb 10.6 on admission  Iron studies / stool for occult blood      Time spent: Trauma / Critical Care Time 60 minutes excluding procedures.      _________________________  Brandt Gutierrez MD

## 2022-06-17 NOTE — ED PROVIDER NOTES
"ED Provider Note    CHIEF COMPLAINT  Chief Complaint   Patient presents with   • Syncope   • Head Injury     Right posterior bump       HPI  Michael Willis is a 89 y.o. male who presents as a code TBI, head injury.  No blood thinners or antiplatelet agents.  Brought in by ambulance.  The patient was sitting outside in the sun, he stood up and apparently passed out falling backwards striking his head.  He denies any preceding chest pain or shortness of breath.  Family members report that he has been having episodes of dizziness for quite some time now.  The patient seems indicate today's episode was somewhat different however.  He has no neck or back pain.  No abdominal pain.  No weakness numbness or tingling.  Other than headache he has no other acute complaints.  Past medical history significant for hypertension, thyroid disease and enlarged prostate.  EMS reports he was somewhat hypotensive with a systolic blood pressure around 90 when they arrived, with IV fluid administration the patient arrives here with a blood pressure of 120 systolic.    REVIEW OF SYSTEMS  Negative for neck pain, focal weakness, focal numbness, focal tingling, chest pain, back pain, abdominal pain. All other systems are negative.     PAST MEDICAL HISTORY  Past Medical History:   Diagnosis Date   • Hypertension    • Hypothyroid        FAMILY HISTORY  History reviewed. No pertinent family history.    SOCIAL HISTORY       SURGICAL HISTORY  History reviewed. No pertinent surgical history.    CURRENT MEDICATIONS  I personally reviewed the medication list in the charting documentation.     ALLERGIES  No Known Allergies    MEDICAL RECORD  I have reviewed patient's medical record and pertinent results are listed above.      PHYSICAL EXAM  VITAL SIGNS: /58   Pulse 61   Temp 36.7 °C (98.1 °F) (Temporal)   Resp 18   Ht 1.676 m (5' 6\")   Wt 63.5 kg (139 lb 15.9 oz)   SpO2 95%   BMI 22.60 kg/m²    Constitutional: Elderly and frail-appearing, " awake and alert, no acute distress  HENT: Normocephalic, posterior scalp hematoma, minimal tenderness, no lacerations or abrasions.  Eyes: No scleral icterus. Normal conjunctiva   Neck: Comfortable movement without any obvious restriction in the range of motion.  Cardiovascular: Upon ascultation I appreciate a regular heart rhythm and a normal rate.  Thorax & Lungs: Normal nonlabored respirations. Upon ascultation, there is no obvious chest wall tenderness. I appreciate no wheezing, rhonchi or rales. There is normal air movement.   Abdomen: The abdomen is not visibly distended. Upon palpation, I find it to be without tenderness.   Skin: The exposed portions of skin reveal no obvious rash or other abnormalities.  Extremities/Musculoskeletal: No obvious sign of acute trauma involving the extremities. No obvious restriction in the range of motion of the major joints   Back: No tenderness appreciated on palpation.   Neurologic: Awake and alert. CN II-XII passively intact. No obvious focal deficits observed.  Normal and symmetric upper and lower extremity motor and sensory function bilaterally.  Psychiatric: Normal affect appropriate for the clinical situation.    DIAGNOSTIC STUDIES / PROCEDURES    LABS/EKG  Results for orders placed or performed during the hospital encounter of 06/17/22   CBC WITH DIFFERENTIAL   Result Value Ref Range    WBC 5.3 4.8 - 10.8 K/uL    RBC 3.35 (L) 4.70 - 6.10 M/uL    Hemoglobin 10.6 (L) 14.0 - 18.0 g/dL    Hematocrit 30.8 (L) 42.0 - 52.0 %    MCV 91.9 81.4 - 97.8 fL    MCH 31.6 27.0 - 33.0 pg    MCHC 34.4 33.7 - 35.3 g/dL    RDW 44.5 35.9 - 50.0 fL    Platelet Count 207 164 - 446 K/uL    MPV 9.0 9.0 - 12.9 fL    Neutrophils-Polys 68.40 44.00 - 72.00 %    Lymphocytes 19.30 (L) 22.00 - 41.00 %    Monocytes 8.50 0.00 - 13.40 %    Eosinophils 1.90 0.00 - 6.90 %    Basophils 1.50 0.00 - 1.80 %    Immature Granulocytes 0.40 0.00 - 0.90 %    Nucleated RBC 0.00 /100 WBC    Neutrophils (Absolute)  3.61 1.82 - 7.42 K/uL    Lymphs (Absolute) 1.02 1.00 - 4.80 K/uL    Monos (Absolute) 0.45 0.00 - 0.85 K/uL    Eos (Absolute) 0.10 0.00 - 0.51 K/uL    Baso (Absolute) 0.08 0.00 - 0.12 K/uL    Immature Granulocytes (abs) 0.02 0.00 - 0.11 K/uL    NRBC (Absolute) 0.00 K/uL   COMP METABOLIC PANEL   Result Value Ref Range    Sodium 142 135 - 145 mmol/L    Potassium 4.4 3.6 - 5.5 mmol/L    Chloride 108 96 - 112 mmol/L    Co2 23 20 - 33 mmol/L    Anion Gap 11.0 7.0 - 16.0    Glucose 156 (H) 65 - 99 mg/dL    Bun 31 (H) 8 - 22 mg/dL    Creatinine 1.71 (H) 0.50 - 1.40 mg/dL    Calcium 8.6 8.5 - 10.5 mg/dL    AST(SGOT) 20 12 - 45 U/L    ALT(SGPT) 10 2 - 50 U/L    Alkaline Phosphatase 72 30 - 99 U/L    Total Bilirubin 0.3 0.1 - 1.5 mg/dL    Albumin 3.6 3.2 - 4.9 g/dL    Total Protein 6.5 6.0 - 8.2 g/dL    Globulin 2.9 1.9 - 3.5 g/dL    A-G Ratio 1.2 g/dL   TROPONIN   Result Value Ref Range    Troponin T 49 (H) 6 - 19 ng/L   ESTIMATED GFR   Result Value Ref Range    GFR (CKD-EPI) 37 (A) >60 mL/min/1.73 m 2        RADIOLOGY  CT-HEAD W/O   Final Result      1. Left frontoparietal convexity subdural hematoma with a maximum thickness of 9.2 mm. Mild mass effect on the frontal horn of the left lateral ventricle. Basilar cisterns are preserved.   2. Age-related central and cortical atrophy.   3. Diffuse chronic microangiopathic white matter changes.      Based solely on CT findings, the brain injury guideline category is mBIG 3.      EDH   IVH   Displaced skull fx   SDH > 8mm   IPH > 8mm or multiple   SAH bi-hemispheric or > 3mm      The original BIG retrospective analysis found radiographic progression in 0% of BIG 1 patients and 2.6% BIG 2.      I, Dr. Deshawn Pemberton, discussed the results of this examination directly by phone with Dr. Ravi on 6/17/2022 at 1150 hours.      US-TRAUMA VEIN SCREEN LOWER BILAT EXTREMITY    (Results Pending)   DX-CHEST-PORTABLE (1 VIEW)    (Results Pending)   EC-ECHOCARDIOGRAM COMPLETE W/O CONT     (Results Pending)   CT-HEAD W/O    (Results Pending)         COURSE & MEDICAL DECISION MAKING  I have reviewed any medical record information, laboratory studies and radiographic results as noted above.  Differential diagnoses includes: ICH, dehydration, syncope, anemia, electrolyte abnormalities, coagulopathy    Encounter Summary: This is a very pleasant 89 y.o. male who unfortunately required evaluation in the emergency department today with a syncopal episode and subsequent ground-level fall with a head injury, code TBI, CT scan obtained which reveals a big 3 subdural hematoma.  No focal neurologic complaints or findings on exam.  Denies anticoagulants or antiplatelet agents, Teg and coags will be obtained.  I have emergently consult Dr. Gutierrez, trauma surgery as well as Dr. Mirza, neurosurgery.  Dr. Mirza recommends repeat head CT per protocol, no need for Keppra in this patient.  The patient will require hospitalization in the ICU.       CRITICAL CARE  The very real possibilty of a deterioration of this patient's condition required the highest level of my preparedness for sudden, emergent intervention.  I provided critical care services, which included medication orders, frequent reevaluations of the patient's condition and response to treatment, ordering and reviewing test results, and discussing the case with various consultants.  The critical care time associated with the care of the patient was 38 minutes. Review chart for interventions. This time is exclusive of any other billable procedures.     DISPOSITION: Admit in guarded condition      FINAL IMPRESSION  1. Subdural hematoma (HCC)    2. Syncope, unspecified syncope type    3. Fall from ground level           This dictation was created using voice recognition software. The accuracy of the dictation is limited to the abilities of the software. I expect there may be some errors of grammar and possibly content. The nursing notes were reviewed and certain  aspects of this information were incorporated into this note.    Electronically signed by: Matt Vega M.D., 6/17/2022 11:58 AM

## 2022-06-17 NOTE — PROGRESS NOTES
1538-Dr. Mirza at the bedside to assess the patient.    MD to notify RN that if patient head CT at 1800 is stable the patient may have a diet and move to q 4 hour neuros if cleared by trauma surgeon. RN will review CT scan of head once completed.

## 2022-06-17 NOTE — ASSESSMENT & PLAN NOTE
Reported syncopal event.   EKG with sinus rhythm.   Echocardiogram with EF 60% but noted moderate with moderate aortic valve stenosis  Carotid dopplers bilateral proximal ICA stenoses exceeding 50%.

## 2022-06-17 NOTE — ED NOTES
Pharmacy Medication Reconciliation      ~Medication reconciliation updated and complete per patient & patient family at bedside  ~Allergies have been verified   ~No oral ABX within the last 30 days  ~[facility]

## 2022-06-18 ENCOUNTER — APPOINTMENT (OUTPATIENT)
Dept: CARDIOLOGY | Facility: MEDICAL CENTER | Age: 87
DRG: 084 | End: 2022-06-18
Attending: SURGERY
Payer: COMMERCIAL

## 2022-06-18 ENCOUNTER — APPOINTMENT (OUTPATIENT)
Dept: RADIOLOGY | Facility: MEDICAL CENTER | Age: 87
DRG: 084 | End: 2022-06-18
Attending: SURGERY
Payer: COMMERCIAL

## 2022-06-18 ENCOUNTER — APPOINTMENT (OUTPATIENT)
Dept: RADIOLOGY | Facility: MEDICAL CENTER | Age: 87
DRG: 084 | End: 2022-06-18
Attending: NURSE PRACTITIONER
Payer: COMMERCIAL

## 2022-06-18 LAB
ANION GAP SERPL CALC-SCNC: 8 MMOL/L (ref 7–16)
BASOPHILS # BLD AUTO: 1 % (ref 0–1.8)
BASOPHILS # BLD: 0.07 K/UL (ref 0–0.12)
BUN SERPL-MCNC: 23 MG/DL (ref 8–22)
CALCIUM SERPL-MCNC: 8.5 MG/DL (ref 8.5–10.5)
CHLORIDE SERPL-SCNC: 110 MMOL/L (ref 96–112)
CO2 SERPL-SCNC: 22 MMOL/L (ref 20–33)
CREAT SERPL-MCNC: 1.11 MG/DL (ref 0.5–1.4)
EOSINOPHIL # BLD AUTO: 0.2 K/UL (ref 0–0.51)
EOSINOPHIL NFR BLD: 2.7 % (ref 0–6.9)
ERYTHROCYTE [DISTWIDTH] IN BLOOD BY AUTOMATED COUNT: 43.4 FL (ref 35.9–50)
GFR SERPLBLD CREATININE-BSD FMLA CKD-EPI: 62 ML/MIN/1.73 M 2
GLUCOSE BLD STRIP.AUTO-MCNC: 100 MG/DL (ref 65–99)
GLUCOSE BLD STRIP.AUTO-MCNC: 103 MG/DL (ref 65–99)
GLUCOSE BLD STRIP.AUTO-MCNC: 84 MG/DL (ref 65–99)
GLUCOSE SERPL-MCNC: 88 MG/DL (ref 65–99)
HCT VFR BLD AUTO: 28.6 % (ref 42–52)
HGB BLD-MCNC: 10.1 G/DL (ref 14–18)
IMM GRANULOCYTES # BLD AUTO: 0.01 K/UL (ref 0–0.11)
IMM GRANULOCYTES NFR BLD AUTO: 0.1 % (ref 0–0.9)
LV EJECT FRACT  99904: 60
LV EJECT FRACT MOD 2C 99903: 77.74
LV EJECT FRACT MOD 4C 99902: 80.12
LV EJECT FRACT MOD BP 99901: 77.93
LYMPHOCYTES # BLD AUTO: 1.16 K/UL (ref 1–4.8)
LYMPHOCYTES NFR BLD: 15.9 % (ref 22–41)
MCH RBC QN AUTO: 32.4 PG (ref 27–33)
MCHC RBC AUTO-ENTMCNC: 35.3 G/DL (ref 33.7–35.3)
MCV RBC AUTO: 91.7 FL (ref 81.4–97.8)
MONOCYTES # BLD AUTO: 0.6 K/UL (ref 0–0.85)
MONOCYTES NFR BLD AUTO: 8.2 % (ref 0–13.4)
NEUTROPHILS # BLD AUTO: 5.25 K/UL (ref 1.82–7.42)
NEUTROPHILS NFR BLD: 72.1 % (ref 44–72)
NRBC # BLD AUTO: 0 K/UL
NRBC BLD-RTO: 0 /100 WBC
PLATELET # BLD AUTO: 203 K/UL (ref 164–446)
PMV BLD AUTO: 9.2 FL (ref 9–12.9)
POTASSIUM SERPL-SCNC: 4.2 MMOL/L (ref 3.6–5.5)
RBC # BLD AUTO: 3.12 M/UL (ref 4.7–6.1)
SODIUM SERPL-SCNC: 140 MMOL/L (ref 135–145)
WBC # BLD AUTO: 7.3 K/UL (ref 4.8–10.8)

## 2022-06-18 PROCEDURE — 82962 GLUCOSE BLOOD TEST: CPT

## 2022-06-18 PROCEDURE — 700102 HCHG RX REV CODE 250 W/ 637 OVERRIDE(OP): Performed by: SURGERY

## 2022-06-18 PROCEDURE — 85025 COMPLETE CBC W/AUTO DIFF WBC: CPT

## 2022-06-18 PROCEDURE — 770020 HCHG ROOM/CARE - TELE (206)

## 2022-06-18 PROCEDURE — 93306 TTE W/DOPPLER COMPLETE: CPT

## 2022-06-18 PROCEDURE — 93306 TTE W/DOPPLER COMPLETE: CPT | Mod: 26 | Performed by: INTERNAL MEDICINE

## 2022-06-18 PROCEDURE — A9270 NON-COVERED ITEM OR SERVICE: HCPCS | Performed by: SURGERY

## 2022-06-18 PROCEDURE — 93970 EXTREMITY STUDY: CPT

## 2022-06-18 PROCEDURE — 99233 SBSQ HOSP IP/OBS HIGH 50: CPT | Performed by: NURSE PRACTITIONER

## 2022-06-18 PROCEDURE — 80048 BASIC METABOLIC PNL TOTAL CA: CPT

## 2022-06-18 PROCEDURE — 93880 EXTRACRANIAL BILAT STUDY: CPT

## 2022-06-18 PROCEDURE — 700111 HCHG RX REV CODE 636 W/ 250 OVERRIDE (IP): Performed by: NURSE PRACTITIONER

## 2022-06-18 RX ORDER — ENOXAPARIN SODIUM 100 MG/ML
30 INJECTION SUBCUTANEOUS EVERY 12 HOURS
Status: DISCONTINUED | OUTPATIENT
Start: 2022-06-18 | End: 2022-06-19 | Stop reason: HOSPADM

## 2022-06-18 RX ORDER — LISINOPRIL 5 MG/1
2.5 TABLET ORAL DAILY
Status: DISCONTINUED | OUTPATIENT
Start: 2022-06-18 | End: 2022-06-19 | Stop reason: HOSPADM

## 2022-06-18 RX ADMIN — POLYETHYLENE GLYCOL 3350 1 PACKET: 17 POWDER, FOR SOLUTION ORAL at 05:30

## 2022-06-18 RX ADMIN — SENNOSIDES AND DOCUSATE SODIUM 1 TABLET: 50; 8.6 TABLET ORAL at 20:12

## 2022-06-18 RX ADMIN — TAMSULOSIN HYDROCHLORIDE 0.4 MG: 0.4 CAPSULE ORAL at 07:38

## 2022-06-18 RX ADMIN — ENOXAPARIN SODIUM 30 MG: 30 INJECTION SUBCUTANEOUS at 10:35

## 2022-06-18 RX ADMIN — ENOXAPARIN SODIUM 30 MG: 30 INJECTION SUBCUTANEOUS at 20:12

## 2022-06-18 RX ADMIN — LEVOTHYROXINE SODIUM 50 MCG: 0.05 TABLET ORAL at 05:30

## 2022-06-18 RX ADMIN — DOCUSATE SODIUM 100 MG: 100 CAPSULE, LIQUID FILLED ORAL at 05:30

## 2022-06-18 RX ADMIN — LISINOPRIL 2.5 MG: 2.5 TABLET ORAL at 10:03

## 2022-06-18 RX ADMIN — DOCUSATE SODIUM 100 MG: 100 CAPSULE, LIQUID FILLED ORAL at 17:30

## 2022-06-18 RX ADMIN — POLYETHYLENE GLYCOL 3350 1 PACKET: 17 POWDER, FOR SOLUTION ORAL at 17:30

## 2022-06-18 ASSESSMENT — COGNITIVE AND FUNCTIONAL STATUS - GENERAL
HELP NEEDED FOR BATHING: A LITTLE
WALKING IN HOSPITAL ROOM: A LITTLE
PERSONAL GROOMING: A LITTLE
TURNING FROM BACK TO SIDE WHILE IN FLAT BAD: A LITTLE
MOBILITY SCORE: 18
DAILY ACTIVITIY SCORE: 19
CLIMB 3 TO 5 STEPS WITH RAILING: A LITTLE
SUGGESTED CMS G CODE MODIFIER MOBILITY: CK
SUGGESTED CMS G CODE MODIFIER DAILY ACTIVITY: CK
TOILETING: A LITTLE
DRESSING REGULAR LOWER BODY CLOTHING: A LITTLE
MOVING TO AND FROM BED TO CHAIR: A LITTLE
STANDING UP FROM CHAIR USING ARMS: A LITTLE
DRESSING REGULAR UPPER BODY CLOTHING: A LITTLE
MOVING FROM LYING ON BACK TO SITTING ON SIDE OF FLAT BED: A LITTLE

## 2022-06-18 ASSESSMENT — ENCOUNTER SYMPTOMS
CARDIOVASCULAR NEGATIVE: 1
RESPIRATORY NEGATIVE: 1
MUSCULOSKELETAL NEGATIVE: 1
NEUROLOGICAL NEGATIVE: 1
CONSTITUTIONAL NEGATIVE: 1
GASTROINTESTINAL NEGATIVE: 1
EYES NEGATIVE: 1

## 2022-06-18 ASSESSMENT — LIFESTYLE VARIABLES
HAVE PEOPLE ANNOYED YOU BY CRITICIZING YOUR DRINKING: NO
EVER HAD A DRINK FIRST THING IN THE MORNING TO STEADY YOUR NERVES TO GET RID OF A HANGOVER: NO
ALCOHOL_USE: NO
ON A TYPICAL DAY WHEN YOU DRINK ALCOHOL HOW MANY DRINKS DO YOU HAVE: 0
CONSUMPTION TOTAL: NEGATIVE
TOTAL SCORE: 0
DOES PATIENT WANT TO STOP DRINKING: NO
HAVE YOU EVER FELT YOU SHOULD CUT DOWN ON YOUR DRINKING: NO
HOW MANY TIMES IN THE PAST YEAR HAVE YOU HAD 5 OR MORE DRINKS IN A DAY: 0
AVERAGE NUMBER OF DAYS PER WEEK YOU HAVE A DRINK CONTAINING ALCOHOL: 0
EVER FELT BAD OR GUILTY ABOUT YOUR DRINKING: NO

## 2022-06-18 ASSESSMENT — PATIENT HEALTH QUESTIONNAIRE - PHQ9
SUM OF ALL RESPONSES TO PHQ9 QUESTIONS 1 AND 2: 0
2. FEELING DOWN, DEPRESSED, IRRITABLE, OR HOPELESS: NOT AT ALL
1. LITTLE INTEREST OR PLEASURE IN DOING THINGS: NOT AT ALL

## 2022-06-18 NOTE — CONSULTS
ID:  Michael Willis; 89 y.o. male      Admission Date: 6/17/2022   Date of Consultation: 6/17/2022  Requesting Provider: Brandt Gutierrez M.D.  PCP: Pcp Pt States None        Chief Complaint:: ground level fall    Reason for consultation:: L SDH      HPI:  Michael Willis is a 89 y.o. male who presented to [facility] after a ground level fall in his garage. He fell backwards and hit his head. Brief LOC. Head CT reveals L acute SDH. Complains of slight headache. Denies N/V, motor weakness or blurry vision.        Past Medical History:  Past Medical History:   Diagnosis Date   • Hypertension    • Hypothyroid        Past Surgical History:  History reviewed. No pertinent surgical history.    Social History:  Social History     Occupational History   • Not on file   Tobacco Use   • Smoking status: Never Smoker   • Smokeless tobacco: Never Used   Vaping Use   • Vaping Use: Never used   Substance and Sexual Activity   • Alcohol use: Never   • Drug use: Never   • Sexual activity: Not on file     Social History     Social History Narrative   • Not on file       Family History:  History reviewed. No pertinent family history.    Medications:  Prior to Admission medications    Medication Sig Start Date End Date Taking? Authorizing Provider   amLODIPine (NORVASC) 10 MG Tab Take 10 mg by mouth every morning.   Yes [facility] Per Protocol [facility]RAE   tamsulosin (FLOMAX) 0.4 MG capsule Take 0.4 mg by mouth every morning.   Yes [facility] Per Protocol [facility]RA[facility]   lisinopril (PRINIVIL) 2.5 MG Tab Take 2.5 mg by mouth every morning.   Yes [facility] Per Protocol, M.D.   levothyroxine (SYNTHROID) 50 MCG Tab Take 50 mcg by mouth every morning.   Yes [facility] Per Protocol, M.ARIADNE   Multiple Vitamin (ONE-A-DAY MENS) Tab Take 1 Tablet by mouth every morning.   Yes Physician Outpatient   Glycerin-Hypromellose- (DRY EYE RELIEF DROPS OP) Administer 1-2 Drops into both eyes every 1 hour as needed (dry  eye).   Yes Physician Outpatient       Allergies:  No Known Allergies    Review of Systems:    negative for constitutional, HEENT, cardiac, pulmonary, GI, , musculoskeletal, psych, dermatologic, endo, hematologic, immunologic except: slight headache      PHYSICAL  EXAMINATION:     General:  Awake and alert, oriented X4  Normal affect, attention and concentration  Fluent, appropriate speech.  No acute distress, well appearing  Briskly follows commands  Hard of hearing    Cranial nerves:  PERRL, EOMI, VFF  Face symmetric, TML    Muscle testing:  RUE 5/5  LUE 5/5  RLE 5/5  LLE 5/5    No pronator drift  Sensation intact to light touch      LABS:  Recent Labs     06/17/22  1145   SODIUM 142   POTASSIUM 4.4   CHLORIDE 108   CO2 23   GLUCOSE 156*   BUN 31*   CREATININE 1.71*   CALCIUM 8.6     Recent Labs     06/17/22  1145   WBC 5.3   RBC 3.35*   HEMOGLOBIN 10.6*   HEMATOCRIT 30.8*   MCV 91.9   MCH 31.6   MCHC 34.4   RDW 44.5   PLATELETCT 207   MPV 9.0     Lab Results   Component Value Date/Time    PROTHROMBTM 13.9 06/17/2022 11:53 AM    INR 1.11 06/17/2022 11:53 AM      Recent Labs     06/17/22  1145 06/17/22  1153   ASTSGOT 20  --    ALTSGPT 10  --    TBILIRUBIN 0.3  --    ALKPHOSPHAT 72  --    GLOBULIN 2.9  --    INR  --  1.11       Radiology Studies:     Left frontoparietal convexity subdural hematoma with a maximum thickness of 9.2 mm. Mild mass effect on the frontal horn of the left lateral ventricle. Basilar cisterns are preserved.    Impression and Plan:     Mr. Michael Willis is a 89 y.o. year old male presenting with left acute SDH     - Q1H neurochecks in ICU  - no keppra  - repeat HCT  - If repeat HCT stable, patient can transition to Q4H neurochecks and advance diet    Thank you for the consultation. Please do not hesitate contacting me with questions.    Trace Mirza III, MD, PhD  Board eligible neurosurgeon  [facility]

## 2022-06-18 NOTE — CARE PLAN
The patient is Stable - Low risk of patient condition declining or worsening    Shift Goals  Clinical Goals: nuro checks  Patient Goals: Education on plan of care  Family Goals: Education on plan of care    Progress made toward(s) clinical / shift goals:    Problem: Pain - Standard  Goal: Alleviation of pain or a reduction in pain to the patient’s comfort goal  Outcome: Progressing     Problem: Knowledge Deficit - Standard  Goal: Patient and family/care givers will demonstrate understanding of plan of care, disease process/condition, diagnostic tests and medications  Outcome: Progressing     Problem: Skin Integrity  Goal: Skin integrity is maintained or improved  Outcome: Progressing     Problem: Fall Risk  Goal: Patient will remain free from falls  Outcome: Progressing     Problem: Self Care  Goal: Patient will have the ability to perform ADLs independently or with assistance (bathe, groom, dress, toilet and feed)  Outcome: Progressing     Problem: Bowel Elimination  Goal: Establish and maintain regular bowel function  Outcome: Progressing     Problem: Mobility  Goal: Patient's capacity to carry out activities will improve  Outcome: Progressing     Problem: Neuro Status  Goal: Neuro status will remain stable or improve  Outcome: Progressing       Patient is not progressing towards the following goals:

## 2022-06-18 NOTE — PROGRESS NOTES
Pt arrived to unit in wheelchair, ambulated to bed with x1 assist. Tele monitor replaced, monitor room informed. Pt is A&Ox4, hard of hearing, denies pain or nausea. Pt is sitting up in bed eating breakfast. Neuro intact, hard of hearing.  Family at bedside. Bed alarm on, call light in reach. Pt and family educated to call for assistance.

## 2022-06-18 NOTE — PROGRESS NOTES
RN to speak with Dr. Matt MD updated that RN is unable to collect the occult stool at this time as patient has had no bowel movement, will pass on to night shift RN.     MD updated on MD Mirza order, that is Head CT is stable patient may transition to q 4 neuro checks and have a diet ordered.

## 2022-06-18 NOTE — CARE PLAN
The patient is Stable - Low risk of patient condition declining or worsening    Shift Goals  Clinical Goals: neuro checks  Patient Goals: understanding plan of care  Family Goals: understanding plan of care    Progress made toward(s) clinical / shift goals:  neuro checks have been stable      Problem: Pain - Standard  Goal: Alleviation of pain or a reduction in pain to the patient’s comfort goal  Outcome: Progressing     Problem: Knowledge Deficit - Standard  Goal: Patient and family/care givers will demonstrate understanding of plan of care, disease process/condition, diagnostic tests and medications  Outcome: Progressing     Problem: Skin Integrity  Goal: Skin integrity is maintained or improved  Outcome: Progressing     Problem: Fall Risk  Goal: Patient will remain free from falls  Outcome: Progressing     Problem: Self Care  Goal: Patient will have the ability to perform ADLs independently or with assistance (bathe, groom, dress, toilet and feed)  Outcome: Progressing     Problem: Urinary Elimination  Goal: Establish and maintain regular urinary output  Outcome: Progressing     Problem: Bowel Elimination  Goal: Establish and maintain regular bowel function  Outcome: Progressing     Problem: Mobility  Goal: Patient's capacity to carry out activities will improve  Outcome: Progressing       Patient is not progressing towards the following goals:

## 2022-06-18 NOTE — PROGRESS NOTES
2 RN skin check completed with OLGA Parker    Areas of concern/Skin observations:  · Patchy, red, flaky area to posterior neck that is blanching  · Sacrum red and blanching-mepilex in place   · Bruising to left elbow and down bilateral arms  · Mild area of swelling to posterior head    Devices in use, assessed under and interventions (as appropriate) for skin protection:  · SCDs, BP cuff, SaO2 monitor    Interventions in place such as:   · q2 hour turns  · Keeping skin clean and dry  · Use of products such as barrier wipes/cream  · Waffle cushion while OOB: yes  · Bed Type: low air loss mattress  · Mobility: stand, chair

## 2022-06-18 NOTE — PROGRESS NOTES
Neurosurgery Progress Note    Subjective:  S/p GLF with small left SDH.  Doing well this AM.  Denies HA or pain.  Repeat CT yesterday was stable.      Exam:  A&O x3  CN 2-12 grossly intact.  EOMI, PERRL  Briskly follows command and moves x4.  No drift.  Patient is hard of hearing.    BP  Min: 103/51  Max: 152/63  Pulse  Av.8  Min: 50  Max: 76  Resp  Av.4  Min: 13  Max: 19  Temp  Av.9 °C (98.4 °F)  Min: 36.6 °C (97.9 °F)  Max: 37.2 °C (98.9 °F)  SpO2  Av.6 %  Min: 91 %  Max: 98 %    No data recorded    Recent Labs     22  1145 22  0400   WBC 5.3 7.3   RBC 3.35* 3.12*   HEMOGLOBIN 10.6* 10.1*   HEMATOCRIT 30.8* 28.6*   MCV 91.9 91.7   MCH 31.6 32.4   MCHC 34.4 35.3   RDW 44.5 43.4   PLATELETCT 207 203   MPV 9.0 9.2     Recent Labs     22  1145 22  0400   SODIUM 142 140   POTASSIUM 4.4 4.2   CHLORIDE 108 110   CO2 23 22   GLUCOSE 156* 88   BUN 31* 23*   CREATININE 1.71* 1.11   CALCIUM 8.6 8.5     Recent Labs     22  1153   APTT 24.5*   INR 1.11     Recent Labs     22  1153   REACTMIN 3.0*   CLOTKINET 0.8   CLOTANGL 78.6*   MAXCLOTS 66.4   IBH11FPO 1.2   PRCINADP 0.0   PRCINAA 0.0       Intake/Output                       22 07 - 22 0659 22 07 - 22 0659      Total [id number] Total                 Intake    P.O.  0  -- 0  --  -- --    P.O. 0 -- 0 -- -- --    I.V.  523.7  996 1519.7  --  -- --    Pre-Hospital Volume 200 -- 200 -- -- --    Trauma Resuscitation Volume 0 -- 0 -- -- --    Volume (mL) (NS infusion) 323.7 996 1319.7 -- -- --    Blood  0  -- 0  --  -- --    PRBC Total Volume (Non-Barcoded) 0 -- 0 -- -- --    FFP Total Volume (Non-Barcoded) 0 -- 0 -- -- --    Platelets Total Volume (Non-Barcoded) 0 -- 0 -- -- --    Cryoprecipitate (Pooled) Total Volume (Non-Barcoded) 0 -- 0 -- -- --    Other  50  -- 50  --  -- --    Medications (PO/Enteral Liquids) 50 -- 50 -- -- --    Total Intake 573.7 996 1569.7 --  -- --       Output    Urine  250  1100 1350  --  -- --    Number of Times Voided 1 x -- 1 x -- -- --    Urine Void (mL) 250 1100 1350 -- -- --    Other  0  -- 0  --  -- --    Pre-Hospital Output 0 -- 0 -- -- --    Trauma Resuscitation Output 0 -- 0 -- -- --    Stool  0  -- 0  --  -- --    Number of Times Stooled 0 x -- 0 x -- -- --    Measurable Stool (mL) 0 -- 0 -- -- --    Blood  0  -- 0  --  -- --    Est. Blood Loss 0 -- 0 -- -- --    Total Output 250 1100 1350 -- -- --       Net I/O     323.7 -104 219.7 -- -- --            Intake/Output Summary (Last 24 hours) at 6/18/2022 0856  Last data filed at 6/18/2022 0600  Gross per 24 hour   Intake 1569.7 ml   Output 1350 ml   Net 219.7 ml       $ Bladder Scan Results (mL): 196    • lisinopril  2.5 mg DAILY   • tamsulosin  0.4 mg AFTER BREAKFAST   • levothyroxine  50 mcg AM ES   • Respiratory Therapy Consult   Continuous RT   • Pharmacy Consult Request  1 Each PHARMACY TO DOSE   • ondansetron  4 mg Q4HRS PRN   • ondansetron  4 mg Q4HRS PRN   • docusate sodium  100 mg BID   • senna-docusate  1 Tablet Nightly   • senna-docusate  1 Tablet Q24HRS PRN   • polyethylene glycol/lytes  1 Packet BID   • magnesium hydroxide  30 mL DAILY   • bisacodyl  10 mg Q24HRS PRN   • sodium phosphate  1 Each Once PRN   • NS   Continuous   • acetaminophen  650 mg Q4HRS PRN    Or   • acetaminophen  650 mg Q4HRS PRN   • morphine injection  1 mg Q3HRS PRN    Or   • oxyCODONE immediate-release  2.5 mg Q3HRS PRN   • butalbital/apap/caffeine  1 Tablet Q6HRS PRN       Assessment and Plan:  Hospital day #2  Patient stable.  CT stable yesterday.  No NS interventions planned.  Ok for q4 hour neuro checks and ok to transfer to floor today.  No NS f/u needed.  May reconsult NS as needed.    Prophylactic anticoagulation: yes         Start date/time: ok to start today.

## 2022-06-18 NOTE — PROGRESS NOTES
Trauma / Surgical Daily Progress Note    Date of Service  6/18/2022    Chief Complaint  89 y.o. male admitted 6/17/2022 with a traumatic subdural hematoma status syncopal event.    Interval Events  Follow up CT head stable. GCS 15. Neurosurgery signed off.    - Ongoing syncope workup.   - Pharmacological DVT prophylaxis cleared by neurosurgery, initiate.   - Clinically stable at this time, transfer to gerard with telemetry monitoring.   - Counseled.    Review of Systems  Review of Systems   Constitutional: Negative.    HENT: Negative.    Eyes: Negative.    Respiratory: Negative.    Cardiovascular: Negative.    Gastrointestinal: Negative.    Genitourinary: Negative.    Musculoskeletal: Negative.    Skin: Negative.    Neurological: Negative.         Vital Signs  Temp:  [36.6 °C (97.9 °F)-37.2 °C (98.9 °F)] 36.9 °C (98.4 °F)  Pulse:  [50-76] 73  Resp:  [13-19] 18  BP: (103-152)/(51-76) 132/76  SpO2:  [91 %-98 %] 94 %    Physical Exam  Physical Exam  Vitals and nursing note reviewed.   Constitutional:       General: He is awake. He is not in acute distress.     Appearance: Normal appearance. He is normal weight. He is not ill-appearing, toxic-appearing or diaphoretic.   HENT:      Head: Normocephalic.      Nose: Nose normal. No congestion.      Mouth/Throat:      Mouth: Mucous membranes are moist.      Pharynx: Oropharynx is clear.   Eyes:      General: No scleral icterus.     Conjunctiva/sclera: Conjunctivae normal.      Pupils: Pupils are equal, round, and reactive to light.   Cardiovascular:      Rate and Rhythm: Normal rate and regular rhythm.      Pulses: Normal pulses.   Pulmonary:      Effort: Pulmonary effort is normal.      Breath sounds: Normal breath sounds.   Chest:      Chest wall: No tenderness.   Abdominal:      General: There is no distension.      Tenderness: There is no abdominal tenderness. There is no guarding or rebound.   Musculoskeletal:         General: No swelling or tenderness. Normal range  of motion.      Cervical back: Normal range of motion and neck supple.   Skin:     General: Skin is warm and dry.      Capillary Refill: Capillary refill takes less than 2 seconds.   Neurological:      General: No focal deficit present.      Mental Status: He is alert.      GCS: GCS eye subscore is 4. GCS verbal subscore is 5. GCS motor subscore is 6.   Psychiatric:         Mood and Affect: Mood normal.         Behavior: Behavior normal. Behavior is cooperative.         Laboratory  Recent Results (from the past 24 hour(s))   CBC WITH DIFFERENTIAL    Collection Time: 06/17/22 11:45 AM   Result Value Ref Range    WBC 5.3 4.8 - 10.8 K/uL    RBC 3.35 (L) 4.70 - 6.10 M/uL    Hemoglobin 10.6 (L) 14.0 - 18.0 g/dL    Hematocrit 30.8 (L) 42.0 - 52.0 %    MCV 91.9 81.4 - 97.8 fL    MCH 31.6 27.0 - 33.0 pg    MCHC 34.4 33.7 - 35.3 g/dL    RDW 44.5 35.9 - 50.0 fL    Platelet Count 207 164 - 446 K/uL    MPV 9.0 9.0 - 12.9 fL    Neutrophils-Polys 68.40 44.00 - 72.00 %    Lymphocytes 19.30 (L) 22.00 - 41.00 %    Monocytes 8.50 0.00 - 13.40 %    Eosinophils 1.90 0.00 - 6.90 %    Basophils 1.50 0.00 - 1.80 %    Immature Granulocytes 0.40 0.00 - 0.90 %    Nucleated RBC 0.00 /100 WBC    Neutrophils (Absolute) 3.61 1.82 - 7.42 K/uL    Lymphs (Absolute) 1.02 1.00 - 4.80 K/uL    Monos (Absolute) 0.45 0.00 - 0.85 K/uL    Eos (Absolute) 0.10 0.00 - 0.51 K/uL    Baso (Absolute) 0.08 0.00 - 0.12 K/uL    Immature Granulocytes (abs) 0.02 0.00 - 0.11 K/uL    NRBC (Absolute) 0.00 K/uL   COMP METABOLIC PANEL    Collection Time: 06/17/22 11:45 AM   Result Value Ref Range    Sodium 142 135 - 145 mmol/L    Potassium 4.4 3.6 - 5.5 mmol/L    Chloride 108 96 - 112 mmol/L    Co2 23 20 - 33 mmol/L    Anion Gap 11.0 7.0 - 16.0    Glucose 156 (H) 65 - 99 mg/dL    Bun 31 (H) 8 - 22 mg/dL    Creatinine 1.71 (H) 0.50 - 1.40 mg/dL    Calcium 8.6 8.5 - 10.5 mg/dL    AST(SGOT) 20 12 - 45 U/L    ALT(SGPT) 10 2 - 50 U/L    Alkaline Phosphatase 72 30 - 99 U/L     Total Bilirubin 0.3 0.1 - 1.5 mg/dL    Albumin 3.6 3.2 - 4.9 g/dL    Total Protein 6.5 6.0 - 8.2 g/dL    Globulin 2.9 1.9 - 3.5 g/dL    A-G Ratio 1.2 g/dL   TROPONIN    Collection Time: 22 11:45 AM   Result Value Ref Range    Troponin T 49 (H) 6 - 19 ng/L   ESTIMATED GFR    Collection Time: 22 11:45 AM   Result Value Ref Range    GFR (CKD-EPI) 37 (A) >60 mL/min/1.73 m 2   PLATELET MAPPING WITH BASIC TEG    Collection Time: 22 11:53 AM   Result Value Ref Range    Reaction Time Initial-R 3.0 (L) 4.6 - 9.1 min    React Time Initial Hep 3.2 (L) 4.3 - 8.3 min    Clot Kinetics-K 0.8 0.8 - 2.1 min    Clot Angle-Angle 78.6 (H) 63.0 - 78.0 degrees    Maximum Clot Strength-MA 66.4 52.0 - 69.0 mm    TEG Functional Fibrinogen(MA) 34.3 (H) 15.0 - 32.0 mm    Lysis 30 minutes-LY30 1.2 0.0 - 2.6 %    % Inhibition ADP 0.0 0.0 - 17.0 %    % Inhibition AA 0.0 0.0 - 11.0 %    TEG Algorithm Link Algorithm    COD - Adult (Type and Screen)    Collection Time: 22 11:53 AM   Result Value Ref Range    ABO Grouping Only A     Rh Grouping Only POS     Antibody Screen-Cod NEG    APTT    Collection Time: 22 11:53 AM   Result Value Ref Range    APTT 24.5 (L) 24.7 - 36.0 sec   Prothrombin Time    Collection Time: 22 11:53 AM   Result Value Ref Range    PT 13.9 12.0 - 14.6 sec    INR 1.11 0.87 - 1.13   ABO Rh Confirm    Collection Time: 22 12:15 PM   Result Value Ref Range    ABO Rh Confirm A POS    SARS-COV Antigen ANGELA    Collection Time: 22 12:40 PM   Result Value Ref Range    SARS-CoV-2 Source NP Swab     SARS-COV ANTIGEN ANGELA NotDetected NotDetected   EKG    Collection Time: 22 12:51 PM   Result Value Ref Range    Report       [facility] Emergency Dept.    Test Date:  2022  Pt Name:    CYNDI MCCARTHY               Department: ER  MRN:        [id number]                      Room:        18  Gender:     Male                         Technician: [id number]  :         1930-08-19                   Requested By:AMPARO ACUNA  Order #:    [id number]                    Reading MD:    Measurements  Intervals                                Axis  Rate:       60                           P:          57  AZ:         168                          QRS:        73  QRSD:       84                           T:          60  QT:         416  QTc:        416    Interpretive Statements  SINUS RHYTHM  BASELINE WANDER IN LEAD(S) I,II,aVR  No previous ECG available for comparison     IRON/TOTAL IRON BIND    Collection Time: 06/17/22  4:24 PM   Result Value Ref Range    Iron 59 50 - 180 ug/dL    Total Iron Binding 210 (L) 250 - 450 ug/dL    Unsat Iron Binding 151 110 - 370 ug/dL    % Saturation 28 15 - 55 %   RETICULOCYTES COUNT    Collection Time: 06/17/22  4:24 PM   Result Value Ref Range    Reticulocyte Count 0.9 0.8 - 2.1 %    Retic, Absolute 0.03 (L) 0.04 - 0.06 M/uL    Imm. Reticulocyte Fraction 4.3 (L) 9.3 - 17.4 %    Retic Hgb Equivalent 34.9 29.0 - 35.0 pg/cell   MAGNESIUM    Collection Time: 06/17/22  4:24 PM   Result Value Ref Range    Magnesium 2.2 1.5 - 2.5 mg/dL   PHOSPHORUS    Collection Time: 06/17/22  4:24 PM   Result Value Ref Range    Phosphorus 2.9 2.5 - 4.5 mg/dL   HEMOGLOBIN A1C    Collection Time: 06/17/22  4:24 PM   Result Value Ref Range    Glycohemoglobin 5.5 4.0 - 5.6 %    Est Avg Glucose 111 mg/dL   POCT glucose device results    Collection Time: 06/17/22  4:25 PM   Result Value Ref Range    POC Glucose, Blood 121 (H) 65 - 99 mg/dL   POCT glucose device results    Collection Time: 06/17/22 11:50 PM   Result Value Ref Range    POC Glucose, Blood 100 (H) 65 - 99 mg/dL   CBC with Differential: Tomorrow AM    Collection Time: 06/18/22  4:00 AM   Result Value Ref Range    WBC 7.3 4.8 - 10.8 K/uL    RBC 3.12 (L) 4.70 - 6.10 M/uL    Hemoglobin 10.1 (L) 14.0 - 18.0 g/dL    Hematocrit 28.6 (L) 42.0 - 52.0 %    MCV 91.7 81.4 - 97.8 fL    MCH 32.4 27.0 - 33.0 pg    MCHC 35.3 33.7 -  35.3 g/dL    RDW 43.4 35.9 - 50.0 fL    Platelet Count 203 164 - 446 K/uL    MPV 9.2 9.0 - 12.9 fL    Neutrophils-Polys 72.10 (H) 44.00 - 72.00 %    Lymphocytes 15.90 (L) 22.00 - 41.00 %    Monocytes 8.20 0.00 - 13.40 %    Eosinophils 2.70 0.00 - 6.90 %    Basophils 1.00 0.00 - 1.80 %    Immature Granulocytes 0.10 0.00 - 0.90 %    Nucleated RBC 0.00 /100 WBC    Neutrophils (Absolute) 5.25 1.82 - 7.42 K/uL    Lymphs (Absolute) 1.16 1.00 - 4.80 K/uL    Monos (Absolute) 0.60 0.00 - 0.85 K/uL    Eos (Absolute) 0.20 0.00 - 0.51 K/uL    Baso (Absolute) 0.07 0.00 - 0.12 K/uL    Immature Granulocytes (abs) 0.01 0.00 - 0.11 K/uL    NRBC (Absolute) 0.00 K/uL   Basic Metabolic Panel (BMP): Tomorrow AM    Collection Time: 06/18/22  4:00 AM   Result Value Ref Range    Sodium 140 135 - 145 mmol/L    Potassium 4.2 3.6 - 5.5 mmol/L    Chloride 110 96 - 112 mmol/L    Co2 22 20 - 33 mmol/L    Glucose 88 65 - 99 mg/dL    Bun 23 (H) 8 - 22 mg/dL    Creatinine 1.11 0.50 - 1.40 mg/dL    Calcium 8.5 8.5 - 10.5 mg/dL    Anion Gap 8.0 7.0 - 16.0   ESTIMATED GFR    Collection Time: 06/18/22  4:00 AM   Result Value Ref Range    GFR (CKD-EPI) 62 >60 mL/min/1.73 m 2   POCT glucose device results    Collection Time: 06/18/22  5:33 AM   Result Value Ref Range    POC Glucose, Blood 84 65 - 99 mg/dL       Fluids    Intake/Output Summary (Last 24 hours) at 6/18/2022 0907  Last data filed at 6/18/2022 0600  Gross per 24 hour   Intake 1569.7 ml   Output 1350 ml   Net 219.7 ml       Core Measures & Quality Metrics  Labs reviewed, Medications reviewed and Radiology images reviewed  Haynes catheter: No Haynes      DVT Prophylaxis: Enoxaparin (Lovenox)  DVT prophylaxis - mechanical: SCDs  Ulcer prophylaxis: Not indicated    Assessed for rehab: Patient returned to prior level of function, rehabilitation not indicated at this time    RAP Score Total: 5    Assesment ETOH: CAGE not completed. DOYLE not done n admission.  Denies alcohol abuse.   Negative.        Assessment/Plan  Traumatic subdural hematoma, initial encounter (HCC)- (present on admission)  Assessment & Plan  Left frontoparietal convexity subdural hematoma with a maximum thickness of 9.2 mm. Mild mass effect on the frontal horn of the left lateral ventricle. mBIG 3  Non-operative management.   Repeat CT head stable   Post traumatic pharmacologic seizure prophylaxis not needed per neurosurgery.  Trace Mirza MD. Neurosurgeon. [facility].    Syncope- (present on admission)  Assessment & Plan  Reported syncopal event.   EKG with sinus rhythm.   Echocardiogram & carotid dopplers pending.    Renal insufficiency- (present on admission)  Assessment & Plan  On presentation Cr 1.71  6/18 Renal indices improved with hydration   Follow    Anemia- (present on admission)  Assessment & Plan  6/17 Hb 10.6 on admission  Iron studies and replace as indicated  Stool for occult blood    Contraindication to deep vein thrombosis (DVT) prophylaxis- (present on admission)  Assessment & Plan  Prophylactic anticoagulation for thrombotic prevention initially contraindicated secondary to elevated bleeding risk.  6/18 Trauma surveillance venous duplex scanning ordered.    Urinary retention- (present on admission)  Assessment & Plan  Chronic condition treated with Flomax.  Resumed maintenance medication on admission.    Hypothyroid- (present on admission)  Assessment & Plan  Chronic condition treated with Synthroid  Resumed maintenance medication on admission.    Hypertension- (present on admission)  Assessment & Plan  Chronic condition treated with Norvasc and Prinvil.  6/17 Resumed Prinvil.    Trauma- (present on admission)  Assessment & Plan  TBI after experiencing a syncopal episode and sticking his head on the cement.  TBI Alert - upgraded to trauma green post scan.  Brandt Gutierrez MD. Trauma Surgery.        Discussed patient condition with Patient and trauma surgery, Dr. Eduardo Lujan.

## 2022-06-19 VITALS
HEIGHT: 66 IN | TEMPERATURE: 97.8 F | RESPIRATION RATE: 16 BRPM | BODY MASS INDEX: 20.8 KG/M2 | DIASTOLIC BLOOD PRESSURE: 68 MMHG | HEART RATE: 67 BPM | SYSTOLIC BLOOD PRESSURE: 156 MMHG | OXYGEN SATURATION: 97 % | WEIGHT: 129.41 LBS

## 2022-06-19 LAB
ANION GAP SERPL CALC-SCNC: 8 MMOL/L (ref 7–16)
BASOPHILS # BLD AUTO: 1.1 % (ref 0–1.8)
BASOPHILS # BLD: 0.07 K/UL (ref 0–0.12)
BUN SERPL-MCNC: 20 MG/DL (ref 8–22)
CALCIUM SERPL-MCNC: 8.5 MG/DL (ref 8.5–10.5)
CHLORIDE SERPL-SCNC: 105 MMOL/L (ref 96–112)
CO2 SERPL-SCNC: 22 MMOL/L (ref 20–33)
CREAT SERPL-MCNC: 0.98 MG/DL (ref 0.5–1.4)
EOSINOPHIL # BLD AUTO: 0.26 K/UL (ref 0–0.51)
EOSINOPHIL NFR BLD: 4.2 % (ref 0–6.9)
ERYTHROCYTE [DISTWIDTH] IN BLOOD BY AUTOMATED COUNT: 42.9 FL (ref 35.9–50)
GFR SERPLBLD CREATININE-BSD FMLA CKD-EPI: 72 ML/MIN/1.73 M 2
GLUCOSE BLD STRIP.AUTO-MCNC: 78 MG/DL (ref 65–99)
GLUCOSE SERPL-MCNC: 81 MG/DL (ref 65–99)
HCT VFR BLD AUTO: 30.1 % (ref 42–52)
HGB BLD-MCNC: 10.3 G/DL (ref 14–18)
IMM GRANULOCYTES # BLD AUTO: 0.02 K/UL (ref 0–0.11)
IMM GRANULOCYTES NFR BLD AUTO: 0.3 % (ref 0–0.9)
LYMPHOCYTES # BLD AUTO: 1.17 K/UL (ref 1–4.8)
LYMPHOCYTES NFR BLD: 18.9 % (ref 22–41)
MCH RBC QN AUTO: 31.4 PG (ref 27–33)
MCHC RBC AUTO-ENTMCNC: 34.2 G/DL (ref 33.7–35.3)
MCV RBC AUTO: 91.8 FL (ref 81.4–97.8)
MONOCYTES # BLD AUTO: 0.63 K/UL (ref 0–0.85)
MONOCYTES NFR BLD AUTO: 10.2 % (ref 0–13.4)
NEUTROPHILS # BLD AUTO: 4.04 K/UL (ref 1.82–7.42)
NEUTROPHILS NFR BLD: 65.3 % (ref 44–72)
NRBC # BLD AUTO: 0 K/UL
NRBC BLD-RTO: 0 /100 WBC
PLATELET # BLD AUTO: 228 K/UL (ref 164–446)
PMV BLD AUTO: 9.6 FL (ref 9–12.9)
POTASSIUM SERPL-SCNC: 4 MMOL/L (ref 3.6–5.5)
RBC # BLD AUTO: 3.28 M/UL (ref 4.7–6.1)
SODIUM SERPL-SCNC: 135 MMOL/L (ref 135–145)
WBC # BLD AUTO: 6.2 K/UL (ref 4.8–10.8)

## 2022-06-19 PROCEDURE — 85025 COMPLETE CBC W/AUTO DIFF WBC: CPT

## 2022-06-19 PROCEDURE — 80048 BASIC METABOLIC PNL TOTAL CA: CPT

## 2022-06-19 PROCEDURE — 82962 GLUCOSE BLOOD TEST: CPT

## 2022-06-19 PROCEDURE — A9270 NON-COVERED ITEM OR SERVICE: HCPCS | Performed by: SURGERY

## 2022-06-19 PROCEDURE — 99239 HOSP IP/OBS DSCHRG MGMT >30: CPT | Performed by: NURSE PRACTITIONER

## 2022-06-19 PROCEDURE — 700111 HCHG RX REV CODE 636 W/ 250 OVERRIDE (IP): Performed by: NURSE PRACTITIONER

## 2022-06-19 PROCEDURE — 700102 HCHG RX REV CODE 250 W/ 637 OVERRIDE(OP): Performed by: SURGERY

## 2022-06-19 PROCEDURE — 36415 COLL VENOUS BLD VENIPUNCTURE: CPT

## 2022-06-19 RX ORDER — ACETAMINOPHEN 325 MG/1
650 TABLET ORAL EVERY 4 HOURS PRN
Qty: 30 TABLET | Refills: 0 | COMMUNITY
Start: 2022-06-19

## 2022-06-19 RX ORDER — AMLODIPINE BESYLATE 10 MG/1
10 TABLET ORAL EVERY MORNING
Qty: 30 TABLET | Refills: 0
Start: 2022-06-19

## 2022-06-19 RX ADMIN — LISINOPRIL 2.5 MG: 2.5 TABLET ORAL at 05:11

## 2022-06-19 RX ADMIN — DOCUSATE SODIUM 100 MG: 100 CAPSULE, LIQUID FILLED ORAL at 05:11

## 2022-06-19 RX ADMIN — ENOXAPARIN SODIUM 30 MG: 30 INJECTION SUBCUTANEOUS at 05:11

## 2022-06-19 RX ADMIN — LEVOTHYROXINE SODIUM 50 MCG: 0.05 TABLET ORAL at 05:11

## 2022-06-19 RX ADMIN — TAMSULOSIN HYDROCHLORIDE 0.4 MG: 0.4 CAPSULE ORAL at 08:50

## 2022-06-19 RX ADMIN — POLYETHYLENE GLYCOL 3350 1 PACKET: 17 POWDER, FOR SOLUTION ORAL at 05:11

## 2022-06-19 ASSESSMENT — PAIN DESCRIPTION - PAIN TYPE: TYPE: ACUTE PAIN

## 2022-06-19 NOTE — CARE PLAN
The patient is Stable - Low risk of patient condition declining or worsening    Shift Goals  Clinical Goals: Neuro checks, pain management  Patient Goals: Rest, discharge  Family Goals: Education on plan of care    Progress made toward(s) clinical / shift goals:  Neuro checks, pain management

## 2022-06-19 NOTE — PROGRESS NOTES
4 Eyes Skin Assessment Completed by Yaritza, OLGA and OLGA Puente.    Head WDL  Ears WDL  Nose WDL  Mouth WDL  Neck WDL  Breast/Chest WDL  Shoulder Blades WDL  Spine WDL  (R) Arm/Elbow/Hand WDL  (L) Arm/Elbow/Hand Abrasion  Abdomen WDL  Groin WDL  Scrotum/Coccyx/Buttocks WDL  (R) Leg WDL  (L) Leg WDL  (R) Heel/Foot/Toe WDL  (L) Heel/Foot/Toe WDL          Devices In Places Pulse Ox and SCD's      Interventions In Place Sacral Mepilex    Possible Skin Injury No    Pictures Uploaded Into Epic N/A  Wound Consult Placed N/A  RN Wound Prevention Protocol Ordered No

## 2022-06-19 NOTE — DISCHARGE SUMMARY
Trauma Discharge Summary    DATE OF ADMISSION: 6/17/2022    DATE OF DISCHARGE: 6/19/2022    LENGTH OF STAY: 2 days    ATTENDING PHYSICIAN: Brandt Gutierrez M.D.    CONSULTING PHYSICIAN:   Yanira. Trace Mirza MD, Neurosurgery      DISCHARGE DIAGNOSIS:  Active Problems:    Syncope POA: Yes    Traumatic subdural hematoma, initial encounter ([facility]) POA: Yes    Contraindication to deep vein thrombosis (DVT) prophylaxis POA: Yes    Anemia POA: Yes    Trauma POA: Yes    Hypertension POA: Yes    Hypothyroid POA: Yes    Urinary retention POA: Yes    Renal insufficiency POA: Yes  Resolved Problems:    * No resolved hospital problems. *      PROCEDURES:  1. None    HISTORY OF PRESENT ILLNESS: The patient is a 89 y.o. male who was reportedly injured in a ground-level fall in his garage.  He was transferred to [facility] in [place], [place].    HOSPITAL COURSE: The patient was triaged as a partial trauma activation. The patient was transported to trauma intensive care unit and ultimately to the gerard.  Patient did have a syncope work-up and did have a echocardiogram with carotid duplexes.  He does show significant stenosis in his carotids and he did have moderate aortic valve stenosis noted on his echo.  He will need to follow-up with his primary care doctor for referral with cardiology to find out these could be the source of his possible cause of his lightheadedness that caused him to have a syncopal episode.  Patient also needs to start monitor his blood pressure and should keep his systolic greater than 100.   Patient also was noted to have some anemia and it has been highly recommended the patient has anemia work-up with his primary care doctor.  Patient's daughter was witness for instructions for discharge    HOSPITAL PROBLEM LIST:  Traumatic subdural hematoma, initial encounter (HCC)- (present on admission)  Assessment & Plan  Left frontoparietal convexity subdural hematoma with a maximum thickness of  9.2 mm. Mild mass effect on the frontal horn of the left lateral ventricle. mBIG 3  Non-operative management.   Repeat CT head stable   Post traumatic pharmacologic seizure prophylaxis not needed per neurosurgery.  Trace Mirza MD. Neurosurgeon. [facility] [place].    Syncope- (present on admission)  Assessment & Plan  Reported syncopal event.   EKG with sinus rhythm.   Echocardiogram with EF 60% but noted moderate with moderate aortic valve stenosis  Carotid dopplers bilateral proximal ICA stenoses exceeding 50%.    Anemia- (present on admission)  Assessment & Plan  6/17 Hb 10.6 on admission  Iron studies and replace as indicated  Stool for occult blood    Contraindication to deep vein thrombosis (DVT) prophylaxis- (present on admission)  Assessment & Plan  Prophylactic anticoagulation for thrombotic prevention initially contraindicated secondary to elevated bleeding risk.  6/18 Trauma surveillance venous duplex scanning ordered.  6/18 Prophylactic dose enoxaparin initiated.     Renal insufficiency- (present on admission)  Assessment & Plan  On presentation Cr 1.71  6/18 Renal indices improved with hydration   Follow    Urinary retention- (present on admission)  Assessment & Plan  Chronic condition treated with Flomax.  Resumed maintenance medication on admission.    Hypothyroid- (present on admission)  Assessment & Plan  Chronic condition treated with Synthroid  Resumed maintenance medication on admission.    Hypertension- (present on admission)  Assessment & Plan  Chronic condition treated with Norvasc and Prinvil.  6/17 Resumed Prinvil.    Trauma- (present on admission)  Assessment & Plan  TBI after experiencing a syncopal episode and sticking his head on the cement.  TBI Alert - upgraded to trauma green post scan.  Brandt Gutierrez MD. Trauma Surgery.          DISPOSITION: Discharged home on . The patient and family were counseled and questions were answered. Specifically, signs and symptoms of infection,  respiratory decompensation, blood pressure control, anemia work up and persistent or worsening pain were discussed and the patient agrees to seek medical attention if any of these develop.    DISCHARGE MEDICATIONS:  The patients controlled substance history was reviewed and a controlled substance use informed consent (if applicable) was provided by [facility] and the patient has been prescribed.     Medication List      START taking these medications      Instructions   acetaminophen 325 MG Tabs  Commonly known as: Tylenol   Take 2 Tablets by mouth every four hours as needed (Fever, temp greater than 101.5 F).  Dose: 650 mg        CONTINUE taking these medications      Instructions   amLODIPine 10 MG Tabs  Commonly known as: NORVASC   Take 1 Tablet by mouth every morning.  Dose: 10 mg     DRY EYE RELIEF DROPS OP   Administer 1-2 Drops into both eyes every 1 hour as needed (dry eye).  Dose: 1-2 Drop     levothyroxine 50 MCG Tabs  Commonly known as: SYNTHROID   Take 50 mcg by mouth every morning.  Dose: 50 mcg     lisinopril 2.5 MG Tabs  Commonly known as: PRINIVIL   Take 2.5 mg by mouth every morning.  Dose: 2.5 mg     One-A-Day Mens Tabs   Take 1 Tablet by mouth every morning.  Dose: 1 Tablet     tamsulosin 0.4 MG capsule  Commonly known as: FLOMAX   Take 0.4 mg by mouth every morning.  Dose: 0.4 mg            ACTIVITY:  Per primary MD once full syncopal work up completed.   Avoid heavy lifting, driving and ladders till cleared by PCP    WOUND CARE:  topical    DIET:  Orders Placed This Encounter   Procedures   • Diet Order Diet: Regular     Standing Status:   Standing     Number of Occurrences:   1     Order Specific Question:   Diet:     Answer:   Regular [1]       FOLLOW UP:  Brandt Gutierrez M.D.  [address]  [place] [address] [phone]    Follow up  As needed and for any questions you may have.    Trace Mirza M.D.  [address]  [address]  [place]  [id number] [phone]    Follow up  As needed and for any questions you have.      TIME SPENT ON DISCHARGE: 33 minutes      ____________________________________________  Kamla Marks D.N.P.    DD: 6/19/2022 3:13 PM

## 2022-06-19 NOTE — DISCHARGE INSTRUCTIONS
- Call or seek medical attention for questions or concerns  - Follow up with Dr. Gutierrez as needed  - Follow up with Dr. Mirza as needed. Avoid all blood thinners including aspirin or NSAIDs (ibuprophen, Advil, Aleve, Motrin) for at least two weeks  - Follow up with primary care provider within one weeks time regarding anemia and blood pressure   - Resume regular diet  - Activity as tolerated  - May take over the counter acetaminophen as needed for pain  - Continue daily over the counter stool softener while on narcotics  - No operation of machinery or motorized vehicles while under the influence of narcotics  - No alcohol, marijuana or illicit drug use while under the influence of narcotics  - In the event of a narcotic overdose naloxone (Narcan) is available without a prescription from any [facility] or [facility]  - No swimming, hot tubs, baths or wound submersion until cleared by outpatient provider. May shower  - No contact sports, strenuous activities, or heavy lifting until cleared by outpatient provider  - If respiratory decompensation, persistent or worsening pain, neurological decompensation, or signs or symptoms of infection occur seek medical attention      Syncope  Syncope is when you pass out (faint) for a short time. It is caused by a sudden decrease in blood flow to the brain. Signs that you may be about to pass out include:  Feeling dizzy or light-headed.  Feeling sick to your stomach (nauseous).  Seeing all white or all black.  Having cold, clammy skin.  If you pass out, get help right away. Call your local emergency services (911 in the [place] Do not drive yourself to the hospital.  Follow these instructions at home:  Watch for any changes in your symptoms. Take these actions to stay safe and help with your symptoms:  Lifestyle  Do not drive, use machinery, or play sports until your doctor says it is okay.  Do not drink alcohol.  Do not use any products that contain nicotine or tobacco, such  as cigarettes and e-cigarettes. If you need help quitting, ask your doctor.  Drink enough fluid to keep your pee (urine) pale yellow.  General instructions  Take over-the-counter and prescription medicines only as told by your doctor.  If you are taking blood pressure or heart medicine, sit up and stand up slowly. Spend a few minutes getting ready to sit and then stand. This can help you feel less dizzy.  Have someone stay with you until you feel stable.  If you start to feel like you might pass out, lie down right away and raise (elevate) your feet above the level of your heart. Breathe deeply and steadily. Wait until all of the symptoms are gone.  Keep all follow-up visits as told by your doctor. This is important.  Get help right away if:  You have a very bad headache.  You pass out once or more than once.  You have pain in your chest, belly, or back.  You have a very fast or uneven heartbeat (palpitations).  It hurts to breathe.  You are bleeding from your mouth or your bottom (rectum).  You have black or tarry poop (stool).  You have jerky movements that you cannot control (seizure).  You are confused.  You have trouble walking.  You are very weak.  You have vision problems.  These symptoms may be an emergency. Do not wait to see if the symptoms will go away. Get medical help right away. Call your local emergency services (911 in the [place] Do not drive yourself to the hospital.  Summary  Syncope is when you pass out (faint) for a short time. It is caused by a sudden decrease in blood flow to the brain.  Signs that you may be about to faint include feeling dizzy, light-headed, or sick to your stomach, seeing all white or all black, or having cold, clammy skin.  If you start to feel like you might pass out, lie down right away and raise (elevate) your feet above the level of your heart. Breathe deeply and steadily. Wait until all of the symptoms are gone.  This information is not intended to replace advice given  to you by your health care provider. Make sure you discuss any questions you have with your health care provider.  Document Released: 06/05/2009 Document Revised: 01/30/2019 Document Reviewed: 01/30/2019  Elsevier Patient Education © 2020 [employer] Inc[employer]    Subdural Hematoma    A subdural hematoma is a collection of blood between the brain and its outer covering (dura). As the amount of blood increases, pressure builds on the brain.  There are two types of subdural hematomas:  Acute. This type develops shortly after a hard, direct hit to the head and causes blood to collect very quickly. This is a medical emergency. If it is not diagnosed and treated quickly, it can lead to severe brain injury or death.  Chronic. This is when bleeding develops more slowly, over weeks or months. In some cases, this type does not cause symptoms.  What are the causes?  This condition is caused by bleeding (hemorrhage) from a broken (ruptured) blood vessel. In most cases, a blood vessel ruptures and bleeds because of a head injury, such as from a hard, direct hit. Head injuries can happen in car accidents, falls, assaults, or while playing sports.  In rare cases, a hemorrhage can happen without a known cause (spontaneously), especially if you take blood thinners (anticoagulants).  What increases the risk?  This condition is more likely to develop in:  Older people.  Infants.  People who take blood thinners.  People who have head injuries.  People who abuse alcohol.  What are the signs or symptoms?  Symptoms of this condition can vary depending on the size of the hematoma. Symptoms can be mild, severe, or life-threatening. They include:  Headaches.  Nausea or vomiting.  Changes in vision, such as double vision or loss of vision.  Changes in speech or trouble understanding what people say.  Loss of balance or trouble walking.  Weakness, numbness, or tingling in the arms or legs, especially on one side of the body.  Seizures.  Change in  personality.  Increased sleepiness.  Memory loss.  Loss of consciousness.  Coma.  Symptoms of acute subdural hematoma can develop over minutes or hours. Symptoms of chronic subdural hematoma may develop over weeks or months.  How is this diagnosed?  This condition is diagnosed based on the results of:  A physical exam.  Tests of strength, reflexes, coordination, senses, manner of walking (gait), and facial and eye movements (neurological exam).  Imaging tests, such as an MRI or a CT scan.  How is this treated?  Treatment for this condition depends on the type of hematoma and how severe it is.  Treatment for acute hematoma may include:  Emergency surgery to drain blood or remove a blood clot.  Medicines that help the body get rid of excess fluids (diuretics). These may help to reduce pressure in the brain.  Assisted breathing (ventilation).  Treatment for chronic hematoma may include:  Observation and bed rest at the hospital.  Surgery.  If you take blood thinners, you may need to stop taking them for a short time. You may also be given anti-seizure (anticonvulsant) medicine.  Sometimes, no treatment is needed for chronic subdural hematoma.  Follow these instructions at home:  Activity  Avoid situations where you could injure your head again, such as in competitive sports, downhill snow sports, and horseback riding. Do not do these activities until your health care provider approves.  Wear protective gear, such as a helmet, when participating in activities such as biking or contact sports.  Avoid too much visual stimulation while recovering. This means limiting how much you read and limiting your screen time on a smart phone, tablet, computer, or TV.  Rest as told by your health care provider. Rest helps the brain heal.  Try to avoid activities that cause physical or mental stress. Return to work or school as told by your health care provider.  Do not lift anything that is heavier than 5 lb (2.3 kg), or the limit you  are told, until your health care provider says that it is safe.  Do not drive, ride a bike, or use heavy machinery until your health care provider approves.  Always wear your seat belt when you are in a motor vehicle.  Alcohol use  Do not drink alcohol if your health care provider tells you not to drink.  If you drink alcohol, limit how much you use to:  0-1 drink a day for women.  0-2 drinks a day for men.  General instructions  Monitor your symptoms, and ask people around you to do the same. Recovery from brain injuries varies. Talk with your health care provider about what to expect.  Take over-the-counter and prescription medicines only as told by your health care provider. Do not take blood thinners or NSAIDs unless your health care provider approves. These include aspirin, ibuprofen, naproxen, and warfarin.  Keep your home environment safe to reduce the risk of falling.  Keep all follow-up visits as told by your health care provider. This is important.  Where to find more information  National [place] of Neurological Disorders and Stroke: www.ninds.nih.gov  American Academy of Neurology (AAN): www.aan.com  Brain Injury Association of [place]: www.biausa.org  Get help right away if you:  Are taking blood thinners and you fall or you experience minor trauma to the head. If you take any blood thinners, even a very small injury can cause a subdural hematoma.  Have a bleeding disorder and you fall or you experience minor trauma to the head.  Develop any of the following symptoms after a head injury:  Clear fluid draining from your nose or ears.  Nausea or vomiting.  Changes in speech or trouble understanding what people say.  Seizures.  Drowsiness or a decrease in alertness.  Double vision.  Numbness or inability to move (paralysis) in any part of your body.  Difficulty walking or poor coordination.  Difficulty thinking.  Confusion or forgetfulness.  Personality changes.  Irrational or aggressive behavior.  These  symptoms may represent a serious problem that is an emergency. Do not wait to see if the symptoms will go away. Get medical help right away. Call your local emergency services (911 in the [place] Do not drive yourself to the hospital.  Summary  A subdural hematoma is a collection of blood between the brain and its outer covering (dura).  Treatment for this condition depends on what type of subdural hematoma you have and how severe it is.  Symptoms can vary from mild to severe to life-threatening.  Monitor your symptoms, and ask others around you to do the same.  This information is not intended to replace advice given to you by your health care provider. Make sure you discuss any questions you have with your health care provider.  Document Released: 11/04/2005 Document Revised: 11/18/2019 Document Reviewed: 11/18/2019  [employer] Patient Education © 2020 [employer]    Discharge Instructions    Discharged to home by car with relative. Discharged via wheelchair, hospital escort: Yes.  Special equipment needed: Not Applicable    Be sure to schedule a follow-up appointment with your primary care doctor or any specialists as instructed.     Discharge Plan:        I understand that a diet low in cholesterol, fat, and sodium is recommended for good health. Unless I have been given specific instructions below for another diet, I accept this instruction as my diet prescription.   Other diet: Regular    Special Instructions: None    Is patient discharged on Warfarin / Coumadin?   No     Depression / Suicide Risk    As you are discharged from this [facility] facility, it is important to learn how to keep safe from harming yourself.    Recognize the warning signs:  Abrupt changes in personality, positive or negative- including increase in energy   Giving away possessions  Change in eating patterns- significant weight changes-  positive or negative  Change in sleeping patterns- unable to sleep or sleeping all the  time   Unwillingness or inability to communicate  Depression  Unusual sadness, discouragement and loneliness  Talk of wanting to die  Neglect of personal appearance   Rebelliousness- reckless behavior  Withdrawal from people/activities they love  Confusion- inability to concentrate     If you or a loved one observes any of these behaviors or has concerns about self-harm, here's what you can do:  Talk about it- your feelings and reasons for harming yourself  Remove any means that you might use to hurt yourself (examples: pills, rope, extension cords, firearm)  Get professional help from the community (Mental Health, Substance Abuse, psychological counseling)  Do not be alone:Call your Safe Contact- someone whom you trust who will be there for you.  Call your local CRISIS HOTLINE 186-9165 or [phone]  Call your local Children's Mobile Crisis Response Team [place] (664) 146-6286 or www.Kamelio  Call the toll free National Suicide Prevention Hotlines   National Suicide Prevention Lifeline 185-909-FHPT (9745)  National Hope Line Network 800-SUICIDE (029-4211)

## 2022-06-19 NOTE — PROGRESS NOTES
HD #2 - TBI after experiencing a syncopal episode and striking head on the cement, trauma green - subdural hematoma.    Reports no pain and has not required the use of pain medications.  No headache.  Hgb 10.3, slight improvement.  No BM since admission.   Tolerating diet.    A/O x 4.  Respiratory rate even and unlabored.  Abdomen soft.  Voiding.  Ambulating.    Cleared for discharge.    Discussed follow up information including the need to follow with PCP and discuss anemia and blood pressure. All questions answered.

## 2022-06-19 NOTE — PROGRESS NOTES
Pt is A&O 4  Pain denied.   Denies nausea  Tolerating a regular diet   Skin intact.  + Voids  + flatus  - BM  Up SBA.  Bed alarm on, pt moderate fall risk per jose kowalski  Reviewed plan of care with patient, bed in lowest position and locked, pt resting comfortably now, call light within reach, all needs met at this time. Interventions will be executed per plan of care

## 2022-06-19 NOTE — CARE PLAN
The patient is Stable - Low risk of patient condition declining or worsening    Shift Goals  Clinical Goals: neuro checks, rest  Patient Goals: est  Family Goals: Education on plan of care    Progress made toward(s) clinical / shift goals:  neuro checks, rest      Problem: Pain - Standard  Goal: Alleviation of pain or a reduction in pain to the patient’s comfort goal  Outcome: Progressing  Flowsheets  Taken 6/19/2022 0148  OB Pain Intervention: Declines  Taken 6/18/2022 2000  Pain Rating Scale (NPRS): 0  Note: Patient continues to decline pain throughout shift and no interventions for pain control are used.      Problem: Knowledge Deficit - Standard  Goal: Patient and family/care givers will demonstrate understanding of plan of care, disease process/condition, diagnostic tests and medications  Outcome: Progressing     Problem: Fall Risk  Goal: Patient will remain free from falls  Outcome: Progressing  Note: Patient remains moderate fall risk. Moderate fall risk interventions in place. Call light within reach of the patient. Bed alarm initiated.      Problem: Neuro Status  Goal: Neuro status will remain stable or improve  Outcome: Progressing  Flowsheets (Taken 6/19/2022 0148)  Level of Consciousness: Alert  Note: Patient remains AX0 4. Cognitive status remains unchanged. Remains on Q4 neuro-checks without changes.        Patient is not progressing towards the following goals:N/A

## 2022-06-19 NOTE — PROGRESS NOTES
Bedside report received.  Assessment complete.  A&O x 4. Patient calls appropriately.  Patient ambulates with standby assist. Bed alarm on.   Patient has 0/10 pain.   Denies N&V. Tolerating diet.  Some abrasions noted, all appropriate to admitting dx.  + void, + flatus  Patient denies SOB.  Patient pleasant with staff and resting in bed.  Review plan with of care with patient. Call light and personal belongings within reach. Hourly rounding in place. All needs met at this time.

## 2022-06-20 NOTE — PROGRESS NOTES
Patient discharged home per MD orders. Patient ambulating without assistance, on RA saturating >90%. SDH/Syncope discharge education provided, follow up appointments, medication safety, patient demonstrated and verbalized understanding. Family at bedside also verbalizes understanding. All questions answered. IV removed. Tolerating diet. Voiding without difficulty. Patient educated to call MD or return to ED for concerns.